# Patient Record
Sex: FEMALE | Race: WHITE | NOT HISPANIC OR LATINO | Employment: OTHER | URBAN - METROPOLITAN AREA
[De-identification: names, ages, dates, MRNs, and addresses within clinical notes are randomized per-mention and may not be internally consistent; named-entity substitution may affect disease eponyms.]

---

## 2020-05-13 ENCOUNTER — HISTORICAL (OUTPATIENT)
Dept: ADMINISTRATIVE | Facility: HOSPITAL | Age: 30
End: 2020-05-13

## 2020-05-13 LAB
ABS NEUT (OLG): 5.52 X10(3)/MCL (ref 2.1–9.2)
ALBUMIN SERPL-MCNC: 4.4 GM/DL (ref 3.5–5)
ALBUMIN/GLOB SERPL: 1.4 RATIO (ref 1.1–2)
ALP SERPL-CCNC: 105 UNIT/L (ref 40–150)
ALT SERPL-CCNC: 35 UNIT/L (ref 0–55)
AST SERPL-CCNC: 54 UNIT/L (ref 5–34)
BASOPHILS # BLD AUTO: 0.1 X10(3)/MCL (ref 0–0.2)
BASOPHILS NFR BLD AUTO: 1 %
BILIRUB SERPL-MCNC: 0.4 MG/DL
BILIRUBIN DIRECT+TOT PNL SERPL-MCNC: 0.1 MG/DL (ref 0–0.5)
BILIRUBIN DIRECT+TOT PNL SERPL-MCNC: 0.3 MG/DL (ref 0–0.8)
BUN SERPL-MCNC: 10.7 MG/DL (ref 7–18.7)
CALCIUM SERPL-MCNC: 9.5 MG/DL (ref 8.4–10.2)
CHLORIDE SERPL-SCNC: 106 MMOL/L (ref 98–107)
CO2 SERPL-SCNC: 23 MMOL/L (ref 22–29)
CREAT SERPL-MCNC: 1.05 MG/DL (ref 0.55–1.02)
EOSINOPHIL # BLD AUTO: 0.1 X10(3)/MCL (ref 0–0.9)
EOSINOPHIL NFR BLD AUTO: 1 %
ERYTHROCYTE [DISTWIDTH] IN BLOOD BY AUTOMATED COUNT: 12.2 % (ref 11.5–17)
GLOBULIN SER-MCNC: 3.2 GM/DL (ref 2.4–3.5)
GLUCOSE SERPL-MCNC: 103 MG/DL (ref 74–100)
HCT VFR BLD AUTO: 41.6 % (ref 37–47)
HGB BLD-MCNC: 14 GM/DL (ref 12–16)
LYMPHOCYTES # BLD AUTO: 2 X10(3)/MCL (ref 0.6–4.6)
LYMPHOCYTES NFR BLD AUTO: 24 %
MCH RBC QN AUTO: 31.4 PG (ref 27–31)
MCHC RBC AUTO-ENTMCNC: 33.7 GM/DL (ref 33–36)
MCV RBC AUTO: 93.3 FL (ref 80–94)
MONOCYTES # BLD AUTO: 0.6 X10(3)/MCL (ref 0.1–1.3)
MONOCYTES NFR BLD AUTO: 7 %
NEUTROPHILS # BLD AUTO: 5.52 X10(3)/MCL (ref 2.1–9.2)
NEUTROPHILS NFR BLD AUTO: 67 %
PLATELET # BLD AUTO: 415 X10(3)/MCL (ref 130–400)
PMV BLD AUTO: 11 FL (ref 9.4–12.4)
POTASSIUM SERPL-SCNC: 4.2 MMOL/L (ref 3.5–5.1)
PROT SERPL-MCNC: 7.6 GM/DL (ref 6.4–8.3)
RBC # BLD AUTO: 4.46 X10(6)/MCL (ref 4.2–5.4)
SODIUM SERPL-SCNC: 140 MMOL/L (ref 136–145)
WBC # SPEC AUTO: 8.3 X10(3)/MCL (ref 4.5–11.5)

## 2020-06-23 ENCOUNTER — HISTORICAL (OUTPATIENT)
Dept: LAB | Facility: HOSPITAL | Age: 30
End: 2020-06-23

## 2020-06-23 LAB
B-HCG SERPL QL: NEGATIVE
POC BETA-HCG (QUAL): NEGATIVE

## 2022-04-11 ENCOUNTER — HISTORICAL (OUTPATIENT)
Dept: ADMINISTRATIVE | Facility: HOSPITAL | Age: 32
End: 2022-04-11

## 2022-04-27 VITALS
OXYGEN SATURATION: 96 % | SYSTOLIC BLOOD PRESSURE: 114 MMHG | BODY MASS INDEX: 24.88 KG/M2 | HEIGHT: 59 IN | DIASTOLIC BLOOD PRESSURE: 84 MMHG | WEIGHT: 123.44 LBS

## 2022-05-24 DIAGNOSIS — G96.01 CSF LEAK FROM NOSE: Primary | ICD-10-CM

## 2022-09-21 ENCOUNTER — HISTORICAL (OUTPATIENT)
Dept: ADMINISTRATIVE | Facility: HOSPITAL | Age: 32
End: 2022-09-21

## 2023-07-03 ENCOUNTER — HOSPITAL ENCOUNTER (INPATIENT)
Facility: HOSPITAL | Age: 33
LOS: 3 days | Discharge: HOME | DRG: 556 | End: 2023-07-06
Attending: EMERGENCY MEDICINE | Admitting: INTERNAL MEDICINE
Payer: MEDICARE

## 2023-07-03 DIAGNOSIS — R29.5 TRANSIENT PARALYSIS: Primary | ICD-10-CM

## 2023-07-03 DIAGNOSIS — R29.898 WEAKNESS OF BOTH LOWER EXTREMITIES: ICD-10-CM

## 2023-07-03 DIAGNOSIS — M54.42 ACUTE MIDLINE LOW BACK PAIN WITH BILATERAL SCIATICA: ICD-10-CM

## 2023-07-03 DIAGNOSIS — M54.41 ACUTE MIDLINE LOW BACK PAIN WITH BILATERAL SCIATICA: ICD-10-CM

## 2023-07-03 DIAGNOSIS — R20.2 PARESTHESIAS: ICD-10-CM

## 2023-07-03 LAB
ALBUMIN SERPL-MCNC: 4.6 G/DL (ref 3.5–5)
ALBUMIN/GLOB SERPL: 1.4 RATIO (ref 1.1–2)
ALP SERPL-CCNC: 85 UNIT/L (ref 40–150)
ALT SERPL-CCNC: 9 UNIT/L (ref 0–55)
AMPHET UR QL SCN: POSITIVE
APPEARANCE UR: CLEAR
AST SERPL-CCNC: 17 UNIT/L (ref 5–34)
B-HCG SERPL QL: NEGATIVE
BACTERIA #/AREA URNS AUTO: NORMAL /HPF
BARBITURATE SCN PRESENT UR: NEGATIVE
BASOPHILS # BLD AUTO: 0.07 X10(3)/MCL
BASOPHILS NFR BLD AUTO: 0.9 %
BENZODIAZ UR QL SCN: POSITIVE
BILIRUB UR QL STRIP.AUTO: NEGATIVE MG/DL
BILIRUBIN DIRECT+TOT PNL SERPL-MCNC: 0.7 MG/DL
BUN SERPL-MCNC: 8.9 MG/DL (ref 7–18.7)
CALCIUM SERPL-MCNC: 10.2 MG/DL (ref 8.4–10.2)
CANNABINOIDS UR QL SCN: NEGATIVE
CHLORIDE SERPL-SCNC: 103 MMOL/L (ref 98–107)
CO2 SERPL-SCNC: 26 MMOL/L (ref 22–29)
COCAINE UR QL SCN: NEGATIVE
COLOR UR: YELLOW
CREAT SERPL-MCNC: 0.98 MG/DL (ref 0.55–1.02)
CRP SERPL-MCNC: <1 MG/L
EOSINOPHIL # BLD AUTO: 0.12 X10(3)/MCL (ref 0–0.9)
EOSINOPHIL NFR BLD AUTO: 1.6 %
ERYTHROCYTE [DISTWIDTH] IN BLOOD BY AUTOMATED COUNT: 12 % (ref 11.5–17)
FENTANYL UR QL SCN: POSITIVE
GFR SERPLBLD CREATININE-BSD FMLA CKD-EPI: >60 MLS/MIN/1.73/M2
GLOBULIN SER-MCNC: 3.2 GM/DL (ref 2.4–3.5)
GLUCOSE SERPL-MCNC: 104 MG/DL (ref 74–100)
GLUCOSE UR QL STRIP.AUTO: NEGATIVE MG/DL
HCT VFR BLD AUTO: 45.3 % (ref 37–47)
HGB BLD-MCNC: 15.3 G/DL (ref 12–16)
IMM GRANULOCYTES # BLD AUTO: 0.03 X10(3)/MCL (ref 0–0.04)
IMM GRANULOCYTES NFR BLD AUTO: 0.4 %
KETONES UR QL STRIP.AUTO: ABNORMAL MG/DL
LEUKOCYTE ESTERASE UR QL STRIP.AUTO: NEGATIVE UNIT/L
LYMPHOCYTES # BLD AUTO: 2.27 X10(3)/MCL (ref 0.6–4.6)
LYMPHOCYTES NFR BLD AUTO: 30.1 %
MAGNESIUM SERPL-MCNC: 2 MG/DL (ref 1.6–2.6)
MCH RBC QN AUTO: 29.9 PG (ref 27–31)
MCHC RBC AUTO-ENTMCNC: 33.8 G/DL (ref 33–36)
MCV RBC AUTO: 88.6 FL (ref 80–94)
MDMA UR QL SCN: NEGATIVE
MONOCYTES # BLD AUTO: 0.47 X10(3)/MCL (ref 0.1–1.3)
MONOCYTES NFR BLD AUTO: 6.2 %
NEUTROPHILS # BLD AUTO: 4.58 X10(3)/MCL (ref 2.1–9.2)
NEUTROPHILS NFR BLD AUTO: 60.8 %
NITRITE UR QL STRIP.AUTO: NEGATIVE
NRBC BLD AUTO-RTO: 0 %
OPIATES UR QL SCN: POSITIVE
PCP UR QL: NEGATIVE
PH UR STRIP.AUTO: 8 [PH]
PH UR: 7.5 [PH] (ref 3–11)
PLATELET # BLD AUTO: 392 X10(3)/MCL (ref 130–400)
PMV BLD AUTO: 10.2 FL (ref 7.4–10.4)
POTASSIUM SERPL-SCNC: 3.9 MMOL/L (ref 3.5–5.1)
PROT SERPL-MCNC: 7.8 GM/DL (ref 6.4–8.3)
PROT UR QL STRIP.AUTO: NEGATIVE MG/DL
RBC # BLD AUTO: 5.11 X10(6)/MCL (ref 4.2–5.4)
RBC #/AREA URNS AUTO: <5 /HPF
RBC UR QL AUTO: NEGATIVE UNIT/L
SODIUM SERPL-SCNC: 138 MMOL/L (ref 136–145)
SP GR UR STRIP.AUTO: 1.01 (ref 1–1.03)
SQUAMOUS #/AREA URNS AUTO: <5 /HPF
UROBILINOGEN UR STRIP-ACNC: 0.2 MG/DL
VIT B12 SERPL-MCNC: 506 PG/ML (ref 213–816)
WBC # SPEC AUTO: 7.54 X10(3)/MCL (ref 4.5–11.5)
WBC #/AREA URNS AUTO: <5 /HPF

## 2023-07-03 PROCEDURE — 99223 PR INITIAL HOSPITAL CARE,LEVL III: ICD-10-PCS | Mod: ,,, | Performed by: SPECIALIST

## 2023-07-03 PROCEDURE — 99223 1ST HOSP IP/OBS HIGH 75: CPT | Mod: ,,, | Performed by: SPECIALIST

## 2023-07-03 PROCEDURE — 63600175 PHARM REV CODE 636 W HCPCS: Performed by: EMERGENCY MEDICINE

## 2023-07-03 PROCEDURE — 25000003 PHARM REV CODE 250: Performed by: INTERNAL MEDICINE

## 2023-07-03 PROCEDURE — 25500020 PHARM REV CODE 255: Performed by: INTERNAL MEDICINE

## 2023-07-03 PROCEDURE — 81001 URINALYSIS AUTO W/SCOPE: CPT | Performed by: EMERGENCY MEDICINE

## 2023-07-03 PROCEDURE — 96375 TX/PRO/DX INJ NEW DRUG ADDON: CPT

## 2023-07-03 PROCEDURE — 21400001 HC TELEMETRY ROOM

## 2023-07-03 PROCEDURE — 86160 COMPLEMENT ANTIGEN: CPT | Performed by: INTERNAL MEDICINE

## 2023-07-03 PROCEDURE — 80307 DRUG TEST PRSMV CHEM ANLYZR: CPT | Performed by: EMERGENCY MEDICINE

## 2023-07-03 PROCEDURE — 63600175 PHARM REV CODE 636 W HCPCS: Performed by: INTERNAL MEDICINE

## 2023-07-03 PROCEDURE — 82607 VITAMIN B-12: CPT | Performed by: INTERNAL MEDICINE

## 2023-07-03 PROCEDURE — 11000001 HC ACUTE MED/SURG PRIVATE ROOM

## 2023-07-03 PROCEDURE — 86140 C-REACTIVE PROTEIN: CPT | Performed by: EMERGENCY MEDICINE

## 2023-07-03 PROCEDURE — 81025 URINE PREGNANCY TEST: CPT | Performed by: EMERGENCY MEDICINE

## 2023-07-03 PROCEDURE — 85025 COMPLETE CBC W/AUTO DIFF WBC: CPT | Performed by: EMERGENCY MEDICINE

## 2023-07-03 PROCEDURE — 99285 EMERGENCY DEPT VISIT HI MDM: CPT | Mod: 25

## 2023-07-03 PROCEDURE — 96361 HYDRATE IV INFUSION ADD-ON: CPT

## 2023-07-03 PROCEDURE — 83735 ASSAY OF MAGNESIUM: CPT | Performed by: EMERGENCY MEDICINE

## 2023-07-03 PROCEDURE — A9577 INJ MULTIHANCE: HCPCS | Performed by: INTERNAL MEDICINE

## 2023-07-03 PROCEDURE — 80053 COMPREHEN METABOLIC PANEL: CPT | Performed by: EMERGENCY MEDICINE

## 2023-07-03 RX ORDER — LEVOTHYROXINE SODIUM 125 UG/1
125 TABLET ORAL
COMMUNITY

## 2023-07-03 RX ORDER — LIDOCAINE 50 MG/G
1 PATCH TOPICAL
Status: COMPLETED | OUTPATIENT
Start: 2023-07-03 | End: 2023-07-06

## 2023-07-03 RX ORDER — ONDANSETRON 2 MG/ML
4 INJECTION INTRAMUSCULAR; INTRAVENOUS
Status: COMPLETED | OUTPATIENT
Start: 2023-07-03 | End: 2023-07-03

## 2023-07-03 RX ORDER — LORAZEPAM 2 MG/ML
1 INJECTION INTRAMUSCULAR ONCE
Status: COMPLETED | OUTPATIENT
Start: 2023-07-03 | End: 2023-07-03

## 2023-07-03 RX ORDER — LEVOTHYROXINE SODIUM 125 UG/1
125 TABLET ORAL
Status: DISCONTINUED | OUTPATIENT
Start: 2023-07-04 | End: 2023-07-06 | Stop reason: HOSPADM

## 2023-07-03 RX ORDER — PREDNISONE 20 MG/1
20 TABLET ORAL DAILY
Status: DISCONTINUED | OUTPATIENT
Start: 2023-07-04 | End: 2023-07-04

## 2023-07-03 RX ORDER — PREDNISONE 10 MG/1
10 TABLET ORAL DAILY
Status: DISCONTINUED | OUTPATIENT
Start: 2023-07-04 | End: 2023-07-03

## 2023-07-03 RX ORDER — OXYCODONE HYDROCHLORIDE 5 MG/1
5 TABLET ORAL EVERY 6 HOURS PRN
Status: DISCONTINUED | OUTPATIENT
Start: 2023-07-03 | End: 2023-07-05

## 2023-07-03 RX ORDER — OXYCODONE HYDROCHLORIDE 5 MG/1
10 TABLET ORAL EVERY 6 HOURS PRN
Status: DISCONTINUED | OUTPATIENT
Start: 2023-07-03 | End: 2023-07-05

## 2023-07-03 RX ORDER — GABAPENTIN 100 MG/1
100 CAPSULE ORAL 3 TIMES DAILY
Status: DISCONTINUED | OUTPATIENT
Start: 2023-07-03 | End: 2023-07-03

## 2023-07-03 RX ORDER — PREDNISONE 10 MG/1
10 TABLET ORAL DAILY
COMMUNITY

## 2023-07-03 RX ORDER — HYDROXYCHLOROQUINE SULFATE 200 MG/1
200 TABLET, FILM COATED ORAL 2 TIMES DAILY
Status: DISCONTINUED | OUTPATIENT
Start: 2023-07-03 | End: 2023-07-06 | Stop reason: HOSPADM

## 2023-07-03 RX ORDER — BACLOFEN 5 MG/1
5 TABLET ORAL 3 TIMES DAILY
Status: DISPENSED | OUTPATIENT
Start: 2023-07-03 | End: 2023-07-05

## 2023-07-03 RX ORDER — LORAZEPAM 2 MG/ML
1 INJECTION INTRAMUSCULAR
Status: COMPLETED | OUTPATIENT
Start: 2023-07-03 | End: 2023-07-03

## 2023-07-03 RX ORDER — GABAPENTIN 300 MG/1
300 CAPSULE ORAL 3 TIMES DAILY
Status: DISCONTINUED | OUTPATIENT
Start: 2023-07-03 | End: 2023-07-06 | Stop reason: HOSPADM

## 2023-07-03 RX ORDER — MORPHINE SULFATE 4 MG/ML
4 INJECTION, SOLUTION INTRAMUSCULAR; INTRAVENOUS
Status: COMPLETED | OUTPATIENT
Start: 2023-07-03 | End: 2023-07-03

## 2023-07-03 RX ORDER — ACETAMINOPHEN 325 MG/1
650 TABLET ORAL EVERY 6 HOURS PRN
Status: DISCONTINUED | OUTPATIENT
Start: 2023-07-03 | End: 2023-07-06 | Stop reason: HOSPADM

## 2023-07-03 RX ORDER — DROPERIDOL 2.5 MG/ML
1.25 INJECTION, SOLUTION INTRAMUSCULAR; INTRAVENOUS ONCE
Status: DISCONTINUED | OUTPATIENT
Start: 2023-07-03 | End: 2023-07-03

## 2023-07-03 RX ORDER — ONDANSETRON 2 MG/ML
4 INJECTION INTRAMUSCULAR; INTRAVENOUS EVERY 6 HOURS PRN
Status: DISCONTINUED | OUTPATIENT
Start: 2023-07-03 | End: 2023-07-04

## 2023-07-03 RX ORDER — DROPERIDOL 2.5 MG/ML
1.25 INJECTION, SOLUTION INTRAMUSCULAR; INTRAVENOUS ONCE
Status: COMPLETED | OUTPATIENT
Start: 2023-07-03 | End: 2023-07-03

## 2023-07-03 RX ORDER — GABAPENTIN 100 MG/1
100 CAPSULE ORAL 3 TIMES DAILY
COMMUNITY

## 2023-07-03 RX ORDER — HYDROXYCHLOROQUINE SULFATE 200 MG/1
200 TABLET, FILM COATED ORAL 2 TIMES DAILY
COMMUNITY

## 2023-07-03 RX ADMIN — LORAZEPAM 1 MG: 2 INJECTION INTRAMUSCULAR; INTRAVENOUS at 04:07

## 2023-07-03 RX ADMIN — LIDOCAINE PATCH 5% 1 PATCH: 700 PATCH TOPICAL at 07:07

## 2023-07-03 RX ADMIN — DROPERIDOL 1.25 MG: 2.5 INJECTION, SOLUTION INTRAMUSCULAR; INTRAVENOUS at 07:07

## 2023-07-03 RX ADMIN — MORPHINE SULFATE 4 MG: 4 INJECTION, SOLUTION INTRAMUSCULAR; INTRAVENOUS at 04:07

## 2023-07-03 RX ADMIN — SODIUM CHLORIDE, POTASSIUM CHLORIDE, SODIUM LACTATE AND CALCIUM CHLORIDE 1000 ML: 600; 310; 30; 20 INJECTION, SOLUTION INTRAVENOUS at 04:07

## 2023-07-03 RX ADMIN — GABAPENTIN 300 MG: 300 CAPSULE ORAL at 08:07

## 2023-07-03 RX ADMIN — HYDROXYCHLOROQUINE SULFATE 200 MG: 200 TABLET, FILM COATED ORAL at 08:07

## 2023-07-03 RX ADMIN — ONDANSETRON 4 MG: 2 INJECTION INTRAMUSCULAR; INTRAVENOUS at 04:07

## 2023-07-03 RX ADMIN — LORAZEPAM 1 MG: 2 INJECTION INTRAMUSCULAR; INTRAVENOUS at 12:07

## 2023-07-03 RX ADMIN — GADOBENATE DIMEGLUMINE 11 ML: 529 INJECTION, SOLUTION INTRAVENOUS at 02:07

## 2023-07-03 RX ADMIN — OXYCODONE HYDROCHLORIDE 10 MG: 5 TABLET ORAL at 07:07

## 2023-07-03 NOTE — CONSULTS
[facility] - Emergency Dept  Neurology  Consult Note    Patient Name: Davonte Ortega  MRN: [id number]  Admission Date: 7/3/2023  Hospital Length of Stay: 0 days  Code Status: No Order   Attending Provider: Celina Lopes MD   Consulting Provider: NAHEED CalvinCNP-BC  Primary Care Physician: Charlie Smith MD  Principal Problem:<principal problem not specified>    Inpatient consult to Neurology  Consult performed by: AMEE Calvin  Consult ordered by: Annita Hopper MD    Inpatient consult to Neurology  Consult performed by: AMEE Calvin  Consult ordered by: RADHA Jo         Subjective:     Chief Complaint:       HPI:   32 y/o female with PMH lupus, ulcerative colitis, fibromyalgia who presented to ED on 7/3 with back pain, numbness, and weakness.  Patient has lupus reportedly in remission and has been off immunosuppressants.  Patient's  at bedside reported he had recent GI virus last week, followed by patient getting it as well, improved by 6/30.  Later that day on 06/30 patient began to have lower back pain near her tailbone with numbness to BLE.  Patient reported taking warm baths really for pain, followed by worsening weakness/numbness to BLE.  Of note, patient is able to ambulate to ED, however, weakness progressed to BUE numbness/weakness and BLE weakness to where she was nonambulatory.  Patient denied bowel/bladder incontinence and saddle anesthesia.  Patient reportedly tearful and unrevealing on initial ED presentation.  She was given morphine and Ativan with improvement of pain and anxiety, but she reported numbness or weakness.  Patient given inapsine in ED followed by improvement of mobility and sensation to BLE.    MRI brain and spine w w/o unrevealing for acute abnormalities or abnormal enhancement. Labs significant for UDS positive for opiates, amphetamine, and fentanyl, otherwise unremarkable.       Past Medical History:   Diagnosis Date     Systemic lupus erythematosus, organ or system involvement unspecified     Ulcerative colitis        History reviewed. No pertinent surgical history.    Review of patient's allergies indicates:   Allergen Reactions    Cefaclor Hives and Swelling    Fluconazole Swelling    Levofloxacin Swelling    Sulfa (sulfonamide antibiotics) Swelling    Trimethoprim Swelling       Current Neurological Medications:     No current facility-administered medications on file prior to encounter.     No current outpatient medications on file prior to encounter.     Family History    None       Tobacco Use    Smoking status: Not on file    Smokeless tobacco: Not on file   Substance and Sexual Activity    Alcohol use: Not on file    Drug use: Not on file    Sexual activity: Not on file     Review of Systems  A 14pt ros was reviewed & is negative unless o/w documented in the hpi    Objective:     Vital Signs (Most Recent):  Temp: 98.3 °F (36.8 °C) (07/03/23 0108)  Pulse: 78 (07/03/23 1000)  Resp: 14 (07/03/23 0715)  BP: 126/86 (07/03/23 1000)  SpO2: 100 % (07/03/23 1000) Vital Signs (24h Range):  Temp:  [98.3 °F (36.8 °C)] 98.3 °F (36.8 °C)  Pulse:  [] 78  Resp:  [14-22] 14  SpO2:  [93 %-100 %] 100 %  BP: (107-127)/(83-98) 126/86     Weight: 53.5 kg (118 lb)  Body mass index is 23.83 kg/m².     Physical Exam  Vitals reviewed.   Constitutional:       General: She is awake.      Appearance: Normal appearance.   HENT:      Head: Normocephalic and atraumatic.      Nose: Nose normal.      Mouth/Throat:      Mouth: Mucous membranes are moist.      Pharynx: Oropharynx is clear.   Eyes:      Extraocular Movements: Extraocular movements intact and EOM normal.      Pupils: Pupils are equal, round, and reactive to light.   Pulmonary:      Effort: Pulmonary effort is normal.   Musculoskeletal:         General: Normal range of motion.      Cervical back: Normal range of motion.   Skin:     General: Skin is warm and dry.   Neurological:       Mental Status: She is alert and oriented to person, place, and time.      Deep Tendon Reflexes:      Reflex Scores:       Patellar reflexes are 2+ on the right side and 2+ on the left side.       Achilles reflexes are 2+ on the right side and 2+ on the left side.  Psychiatric:         Speech: Speech normal.         Behavior: Behavior is cooperative.        NEUROLOGICAL EXAMINATION:     MENTAL STATUS   Oriented to person, place, and time.   Follows 3 step commands.   Attention: normal. Concentration: normal.   Speech: speech is normal   Level of consciousness: alert  Knowledge: good.     CRANIAL NERVES     CN II   Visual fields full to confrontation.     CN III, IV, VI   Pupils are equal, round, and reactive to light.  Extraocular motions are normal.   Nystagmus: none   Conjugate gaze: present    CN V   Facial sensation intact.     CN VII   Facial expression full, symmetric.     MOTOR EXAM     Strength   Right deltoid: 5/5  Left deltoid: 5/5  Right biceps: 5/5  Left biceps: 5/5  Right triceps: 5/5  Left triceps: 5/5  Right quadriceps: 5/5  Left quadriceps: 5/5  Right hamstrin/5  Left hamstrin/5  Right glutei: 5/5  Right anterior tibial: 5/5  Left anterior tibial: 5/5  Right posterior tibial: 5/5  Left posterior tibial: 5/5    REFLEXES     Reflexes   Right patellar: 2+  Left patellar: 2+  Right achilles: 2+  Left achilles: 2+  Right plantar: normal  Left plantar: normal  Right ankle clonus: absent  Left ankle clonus: absent    SENSORY EXAM   Light touch normal.     Significant Labs:   Recent Lab Results         23  0555   23  0153        Phencyclidine Negative         Albumin/Globulin Ratio   1.4       Albumin   4.6       Alkaline Phosphatase   85       ALT   9       Amphetamine Screen, Ur Positive         AST   17       Barbiturate Screen, Ur Negative         Baso #   0.07       Basophil %   0.9       Benzodiazepine Screen, Urine Positive         BILIRUBIN TOTAL   0.7       BUN   8.9        Calcium   10.2       Cannabinoids, Urine Negative         Chloride   103       CO2   26       Cocaine (Metab.) Negative         Creatinine   0.98       CRP   <1.00       eGFR   >60       Eos #   0.12       Eosinophil %   1.6       Fentanyl, Urine Positive         Globulin, Total   3.2       Glucose   104       Hematocrit   45.3       Hemoglobin   15.3       Immature Grans (Abs)   0.03       Immature Granulocytes   0.4       Lymph #   2.27       LYMPH %   30.1       Magnesium   2.00       MCH   29.9       MCHC   33.8       MCV   88.6       MDMA, Urine Negative         Mono #   0.47       Mono %   6.2       MPV   10.2       Neut #   4.58       Neut %   60.8       nRBC   0.0       Opiate Scrn, Ur Positive         pH, Urine 7.5         Platelets   392       Potassium   3.9       Preg Test, Ur Negative         PROTEIN TOTAL   7.8       RBC   5.11       RDW   12.0       Sodium   138       WBC   7.54               Significant Imaging:   MRI brain w w/o 7/3/2023:  FINDINGS:  No intracranial mass or lesion is seen.  No hemorrhage is seen.  No acute infarct is seen.  No diffusion abnormality seen.  No parenchymal abnormality is seen.  Gyri and sulci appear normal.  Ventricles and basilar cisterns appear grossly unremarkable.  No abnormal white matter changes are seen..  Posterior fossa appears normal.  Postcontrast imaging demonstrates no evidence of abnormal enhancement.  No mass is seen.  No vascular enhancement seen.  No dural enhancement is seen.  Calvarium is intact.  There is evidence of right maxillary sinusitis     Impression:     Right maxillary sinusitis otherwise unremarkable    MRI c-spine w w/o 7/3/2023:  FINDINGS:  Cervical alignment is normal.  The vertebral body heights are maintained.  The bone marrow is normal signal.     There is no cord signal abnormality.  There is no abnormal cord enhancement.  There is no drainable fluid collection.     A tiny right central disc protrusion at C4-C5 measures less than 2  mm in thickness.  There is no significant spinal canal or neural foraminal stenosis.     The paraspinal soft tissues are unremarkable.  There is no drainable fluid collection.     Impression:     No cord signal abnormality or evidence of active demyelination.    MRI t-spine w w/o 7/3/2023:  FINDINGS:  Thoracic alignment is normal.  The vertebral body heights are maintained.  The bone marrow is normal in signal.     There is no cord signal abnormality.  There is no abnormal cord enhancement.  There is no epidural fluid collection.     There is no disc herniation.  The spinal canal and neural foramina are patent.     The paraspinal soft tissues are unremarkable.     Impression:     No cord signal abnormality or evidence of active demyelination.     MRI l-spine w w/o 7/3/2023:  FINDINGS:  The vertebral body heights and alignment are well maintained.  No fracture seen.  No dislocation is seen.  Cord ends at T12-L1.  Visualized portion of the conus appears grossly unremarkable.  No cord lesion is seen.  No lesion is seen in the cauda equina.  No areas of abnormal enhancement are seen.     No disc bulge or herniation is seen.  No spinal or foraminal stenosis seen.  No significant facet arthropathy is seen.     Paraspinal soft tissues appear grossly unremarkable.     Impression:     Normal MRI of the lumbar spine with no evidence of demyelinating process seen  I have reviewed all pertinent imaging results/findings within the past 24 hours.    Assessment and Plan:     Weakness of both lower extremities  Resolved    -Fall precautions          VTE Risk Mitigation (From admission, onward)    None          Thank you for your consult. Further recommendations to follow per MD Kajal Mena, [facility]-BC  Inpatient Neurology  [facility] - Emergency Dept

## 2023-07-03 NOTE — SUBJECTIVE & OBJECTIVE
Past Medical History:   Diagnosis Date    Systemic lupus erythematosus, organ or system involvement unspecified     Ulcerative colitis        History reviewed. No pertinent surgical history.    Review of patient's allergies indicates:   Allergen Reactions    Cefaclor Hives and Swelling    Fluconazole Swelling    Levofloxacin Swelling    Sulfa (sulfonamide antibiotics) Swelling    Trimethoprim Swelling       Current Neurological Medications:     No current facility-administered medications on file prior to encounter.     No current outpatient medications on file prior to encounter.     Family History    None       Tobacco Use    Smoking status: Not on file    Smokeless tobacco: Not on file   Substance and Sexual Activity    Alcohol use: Not on file    Drug use: Not on file    Sexual activity: Not on file     Review of Systems  A 14pt ros was reviewed & is negative unless o/w documented in the hpi    Objective:     Vital Signs (Most Recent):  Temp: 98.3 °F (36.8 °C) (07/03/23 0108)  Pulse: 78 (07/03/23 1000)  Resp: 14 (07/03/23 0715)  BP: 126/86 (07/03/23 1000)  SpO2: 100 % (07/03/23 1000) Vital Signs (24h Range):  Temp:  [98.3 °F (36.8 °C)] 98.3 °F (36.8 °C)  Pulse:  [] 78  Resp:  [14-22] 14  SpO2:  [93 %-100 %] 100 %  BP: (107-127)/(83-98) 126/86     Weight: 53.5 kg (118 lb)  Body mass index is 23.83 kg/m².     Physical Exam  Vitals reviewed.   Constitutional:       General: She is awake.      Appearance: Normal appearance.   HENT:      Head: Normocephalic and atraumatic.      Nose: Nose normal.      Mouth/Throat:      Mouth: Mucous membranes are moist.      Pharynx: Oropharynx is clear.   Eyes:      Extraocular Movements: Extraocular movements intact and EOM normal.      Pupils: Pupils are equal, round, and reactive to light.   Pulmonary:      Effort: Pulmonary effort is normal.   Musculoskeletal:         General: Normal range of motion.      Cervical back: Normal range of motion.   Skin:     General: Skin is  warm and dry.   Neurological:      Mental Status: She is alert and oriented to person, place, and time.      Deep Tendon Reflexes:      Reflex Scores:       Patellar reflexes are 2+ on the right side and 2+ on the left side.       Achilles reflexes are 2+ on the right side and 2+ on the left side.  Psychiatric:         Speech: Speech normal.         Behavior: Behavior is cooperative.        NEUROLOGICAL EXAMINATION:     MENTAL STATUS   Oriented to person, place, and time.   Follows 3 step commands.   Attention: normal. Concentration: normal.   Speech: speech is normal   Level of consciousness: alert  Knowledge: good.     CRANIAL NERVES     CN II   Visual fields full to confrontation.     CN III, IV, VI   Pupils are equal, round, and reactive to light.  Extraocular motions are normal.   Nystagmus: none   Conjugate gaze: present    CN V   Facial sensation intact.     CN VII   Facial expression full, symmetric.     MOTOR EXAM     Strength   Right deltoid: 5/5  Left deltoid: 5/5  Right biceps: 5/5  Left biceps: 5/5  Right triceps: 5/5  Left triceps: 5/5  Right quadriceps: 5/5  Left quadriceps: 5/5  Right hamstrin/5  Left hamstrin/5  Right glutei: 5/5  Right anterior tibial: 5/5  Left anterior tibial: 5/5  Right posterior tibial: 5/5  Left posterior tibial: 5/5    REFLEXES     Reflexes   Right patellar: 2+  Left patellar: 2+  Right achilles: 2+  Left achilles: 2+  Right plantar: normal  Left plantar: normal  Right ankle clonus: absent  Left ankle clonus: absent    SENSORY EXAM   Light touch normal.     Significant Labs:   Recent Lab Results         23  0555   23  0153        Phencyclidine Negative         Albumin/Globulin Ratio   1.4       Albumin   4.6       Alkaline Phosphatase   85       ALT   9       Amphetamine Screen, Ur Positive         AST   17       Barbiturate Screen, Ur Negative         Baso #   0.07       Basophil %   0.9       Benzodiazepine Screen, Urine Positive         BILIRUBIN TOTAL    0.7       BUN   8.9       Calcium   10.2       Cannabinoids, Urine Negative         Chloride   103       CO2   26       Cocaine (Metab.) Negative         Creatinine   0.98       CRP   <1.00       eGFR   >60       Eos #   0.12       Eosinophil %   1.6       Fentanyl, Urine Positive         Globulin, Total   3.2       Glucose   104       Hematocrit   45.3       Hemoglobin   15.3       Immature Grans (Abs)   0.03       Immature Granulocytes   0.4       Lymph #   2.27       LYMPH %   30.1       Magnesium   2.00       MCH   29.9       MCHC   33.8       MCV   88.6       MDMA, Urine Negative         Mono #   0.47       Mono %   6.2       MPV   10.2       Neut #   4.58       Neut %   60.8       nRBC   0.0       Opiate Scrn, Ur Positive         pH, Urine 7.5         Platelets   392       Potassium   3.9       Preg Test, Ur Negative         PROTEIN TOTAL   7.8       RBC   5.11       RDW   12.0       Sodium   138       WBC   7.54               Significant Imaging:   MRI brain w w/o 7/3/2023:  FINDINGS:  No intracranial mass or lesion is seen.  No hemorrhage is seen.  No acute infarct is seen.  No diffusion abnormality seen.  No parenchymal abnormality is seen.  Gyri and sulci appear normal.  Ventricles and basilar cisterns appear grossly unremarkable.  No abnormal white matter changes are seen..  Posterior fossa appears normal.  Postcontrast imaging demonstrates no evidence of abnormal enhancement.  No mass is seen.  No vascular enhancement seen.  No dural enhancement is seen.  Calvarium is intact.  There is evidence of right maxillary sinusitis     Impression:     Right maxillary sinusitis otherwise unremarkable    MRI c-spine w w/o 7/3/2023:  FINDINGS:  Cervical alignment is normal.  The vertebral body heights are maintained.  The bone marrow is normal signal.     There is no cord signal abnormality.  There is no abnormal cord enhancement.  There is no drainable fluid collection.     A tiny right central disc protrusion at  C4-C5 measures less than 2 mm in thickness.  There is no significant spinal canal or neural foraminal stenosis.     The paraspinal soft tissues are unremarkable.  There is no drainable fluid collection.     Impression:     No cord signal abnormality or evidence of active demyelination.    MRI t-spine w w/o 7/3/2023:  FINDINGS:  Thoracic alignment is normal.  The vertebral body heights are maintained.  The bone marrow is normal in signal.     There is no cord signal abnormality.  There is no abnormal cord enhancement.  There is no epidural fluid collection.     There is no disc herniation.  The spinal canal and neural foramina are patent.     The paraspinal soft tissues are unremarkable.     Impression:     No cord signal abnormality or evidence of active demyelination.     MRI l-spine w w/o 7/3/2023:  FINDINGS:  The vertebral body heights and alignment are well maintained.  No fracture seen.  No dislocation is seen.  Cord ends at T12-L1.  Visualized portion of the conus appears grossly unremarkable.  No cord lesion is seen.  No lesion is seen in the cauda equina.  No areas of abnormal enhancement are seen.     No disc bulge or herniation is seen.  No spinal or foraminal stenosis seen.  No significant facet arthropathy is seen.     Paraspinal soft tissues appear grossly unremarkable.     Impression:     Normal MRI of the lumbar spine with no evidence of demyelinating process seen  I have reviewed all pertinent imaging results/findings within the past 24 hours.

## 2023-07-03 NOTE — HPI
34 y/o female with PMH lupus, ulcerative colitis, fibromyalgia who presented to ED on 7/3 with back pain, numbness, and weakness.  Patient has lupus reportedly in remission and has been off immunosuppressants.  Patient's  at bedside reported he had recent GI virus last week, followed by patient getting it as well, improved by 6/30.  Later that day on 06/30 patient began to have lower back pain near her tailbone with numbness to BLE.  Patient reported taking warm baths really for pain, followed by worsening weakness/numbness to BLE.  Of note, patient is able to ambulate to ED, however, weakness progressed to BUE numbness/weakness and BLE weakness to where she was nonambulatory.  Patient denied bowel/bladder incontinence and saddle anesthesia.  Patient reportedly tearful and unrevealing on initial ED presentation.  She was given morphine and Ativan with improvement of pain and anxiety, but she reported numbness or weakness.  Patient given inapsine in ED followed by improvement of mobility and sensation to BLE.    MRI brain and spine w w/o unrevealing for acute abnormalities or abnormal enhancement. Labs significant for UDS positive for opiates, amphetamine, and fentanyl, otherwise unremarkable.

## 2023-07-03 NOTE — ED PROVIDER NOTES
"Encounter Date: 7/3/2023    SCRIBE #1 NOTE: I, Vaibhav Skelton, am scribing for, and in the presence of,  Annita Hopper MD. I have scribed the following portions of the note - Other sections scribed: HPI,ROS,PE.     History     Chief Complaint   Patient presents with    Numbness     Pt c/o numbness or tingling to bilateral lower extremities. During the week pt had a stomach bug, improved by Friday and then Friday evening pt started to have lower back pain and some numbness then today numbness and tingling has gotten worse. Pt states also decreased muscle strength and difficulty walking. PMH of lupus, UC, Hashimoto's, fibromyalgia,      34 y/o female with PMH of lupus, ulcerative colitis and fibromyalgia presents to ED for back pain, numbness and weakness.   Per , patient's lupus has been in remission for the past year and a half. She has been on no immunosuppressants. Two weeks ago, she began having a "lupus rash" on her arms and her doctor started her on Prednisone and it has nearly resolved.  reports he got a GI virus last week and then patient got it, improved on Friday 6/30. Later that day, she began having lower back pain near her tailbone and numbness to both legs. She reports she took a warm bath tonight to relieve her pain and since then has had worsening weakness/numbness to her legs. She was able to walk into the ED but now has progressed to bilateral arm numbness/weakness and she is unable to walk.       The history is provided by the patient and the spouse. No  was used.   Review of patient's allergies indicates:   Allergen Reactions    Cefaclor Hives and Swelling    Fluconazole Swelling    Levofloxacin Swelling    Sulfa (sulfonamide antibiotics) Swelling    Trimethoprim Swelling     Past Medical History:   Diagnosis Date    Systemic lupus erythematosus, organ or system involvement unspecified     Ulcerative colitis      History reviewed. No pertinent surgical " history.  History reviewed. No pertinent family history.     Review of Systems   Respiratory:  Negative for shortness of breath.    Gastrointestinal:  Positive for nausea and vomiting.   Musculoskeletal:  Positive for back pain.   Skin:  Positive for rash.   Neurological:  Positive for weakness and numbness.        Tingling in arms. Numbness in legs.    Psychiatric/Behavioral:  The patient is nervous/anxious.      Physical Exam     Initial Vitals [07/03/23 0108]   BP Pulse Resp Temp SpO2   (!) 127/98 (!) 113 18 98.3 °F (36.8 °C) 96 %      MAP       --         Physical Exam    Nursing note and vitals reviewed.  Constitutional: She appears well-developed and well-nourished. She is not diaphoretic. She does not appear ill. She appears distressed.   Extremely anxious, tearful, hyperventilating    HENT:   Head: Normocephalic and atraumatic.   Right Ear: External ear normal.   Left Ear: External ear normal.   Mouth/Throat: Oropharynx is clear and moist.   Eyes: Conjunctivae and EOM are normal. Pupils are equal, round, and reactive to light.   Neck: Neck supple. No tracheal deviation present.   Cardiovascular:  Regular rhythm and normal heart sounds.           No murmur heard.  Tachycardic.    Pulmonary/Chest: Tachypnea noted. No respiratory distress. She has no wheezes. She has no rhonchi. She has no rales.   hyperventilating   Abdominal: Abdomen is soft. She exhibits no distension. There is no abdominal tenderness.   No right CVA tenderness.  No left CVA tenderness.   Musculoskeletal:         General: No edema.      Cervical back: Neck supple.      Comments: +lumbar spine tenderness      Neurological: She is alert and oriented to person, place, and time. She displays no seizure activity. GCS score is 15. GCS eye subscore is 4. GCS verbal subscore is 5. GCS motor subscore is 6.   Diminished sensation of BUE, chest/abdomen, BLE but has good perineal sensation. Good rectal tone. Difficult to assess motor strength, will not  move against resistance but when arms put in extension, able to keep them held in that position. No pronator drift    Skin: Skin is warm and dry. Capillary refill takes less than 2 seconds. No rash noted. No pallor.       ED Course   Procedures  Labs Reviewed   COMPREHENSIVE METABOLIC PANEL - Abnormal; Notable for the following components:       Result Value    Glucose Level 104 (*)     All other components within normal limits   DRUG SCREEN, URINE (BEAKER) - Abnormal; Notable for the following components:    Amphetamines, Urine Positive (*)     Benzodiazepine, Urine Positive (*)     Fentanyl, Urine Positive (*)     Opiates, Urine Positive (*)     All other components within normal limits    Narrative:     Cut off concentrations:    Amphetamines - 1000 ng/ml  Barbiturates - 200 ng/ml  Benzodiazepine - 200 ng/ml  Cannabinoids (THC) - 50 ng/ml  Cocaine - 300 ng/ml  Fentanyl - 1.0 ng/ml  MDMA - 500 ng/ml  Opiates - 300 ng/ml   Phencyclidine (PCP) - 25 ng/ml    Specimen submitted for drug analysis and tested for pH and specific gravity in order to evaluate sample integrity. Suspect tampering if specific gravity is <1.003 and/or pH is not within the range of 4.5 - 8.0  False negatives may result form substances such as bleach added to urine.  False positives may result for the presence of a substance with similar chemical structure to the drug or its metabolite.    This test provides only a PRELIMINARY analytical test result. A more specific alternate chemical method must be used in order to obtain a confirmed analytical result. Gas chromatography/mass spectrometry (GC/MS) is the preferred confirmatory method. Other chemical confirmation methods are available. Clinical consideration and professional judgement should be applied to any drug of abuse test result, particularly when preliminary positive results are used.    Positive results will be confirmed only at the physicians request. Unconfirmed screening results are to  be used only for medical purposes (treatment).        PREGNANCY TEST, URINE RAPID - Normal   MAGNESIUM - Normal   C-REACTIVE PROTEIN - Normal   CBC W/ AUTO DIFFERENTIAL    Narrative:     The following orders were created for panel order CBC auto differential.  Procedure                               Abnormality         Status                     ---------                               -----------         ------                     CBC with Differential[[id number]]                            Final result                 Please view results for these tests on the individual orders.   CBC WITH DIFFERENTIAL   URINALYSIS, REFLEX TO URINE CULTURE          Imaging Results              CT Head Without Contrast (Final result)  Result time 07/03/23 09:43:01      Final result by Silvia Serrato MD (07/03/23 09:43:01)                   Impression:      No acute intracranial abnormality.      Electronically signed by: Silvia Serrato  Date:    07/03/2023  Time:    09:43               Narrative:    EXAMINATION:  CT HEAD WITHOUT CONTRAST    CLINICAL HISTORY:  weakness/numbness;    TECHNIQUE:  Axial scans were obtained from skull base to the vertex.    Coronal and sagittal reconstructions obtained from the axial data.    Automatic exposure control was utilized to limit radiation dose.    Contrast: None    Radiation Dose:    Total DLP: 923 mGy*cm    COMPARISON:  None    FINDINGS:  There is no acute intracranial hemorrhage or edema. The gray-white matter differentiation is preserved.    There is no mass effect or midline shift. The ventricles and sulci are normal in size. The basal cisterns are patent. There is no abnormal extra-axial fluid collection.    The calvarium and skull base are intact.  There is a right maxillary sinus mucosal retention cyst.                                       CT Lumbar Spine Without Contrast (Final result)  Result time 07/03/23 09:45:12      Final result by Silvia Serrato MD (07/03/23 09:45:12)                    Impression:      No acute abnormality identified.      Electronically signed by: Silvia Serrato  Date:    07/03/2023  Time:    09:45               Narrative:    EXAMINATION:  CT LUMBAR SPINE WITHOUT CONTRAST    CLINICAL HISTORY:  Low back pain, cauda equina syndrome suspected;    TECHNIQUE:  Noncontrast CT images of the lumbar spine. Axial, coronal, and sagittal reformatted images were obtained. Dose length product is 450 mGycm. Automatic exposure control, adjustment of mA/kV or iterative reconstruction technique was used to limit radiation dose.    COMPARISON:  None    FINDINGS:  There are 5 non-rib-bearing lumbar type vertebral bodies.  Alignment is normal.  The vertebral heights are maintained.  There is no acute fracture identified.  There is no evidence of a disc herniation.  The spinal canal and neural foramina are patent.  The paraspinal soft tissues are unremarkable.                                       Medications   LORazepam injection 1 mg (1 mg Intravenous Given 7/3/23 0429)   morphine injection 4 mg (4 mg Intravenous Given 7/3/23 0429)   ondansetron injection 4 mg (4 mg Intravenous Given 7/3/23 0429)   lactated ringers bolus 1,000 mL (0 mLs Intravenous Stopped 7/3/23 0530)   droPERidol injection 1.25 mg (1.25 mg Intravenous Given 7/3/23 0709)              Scribe Attestation:   Scribe #1: I performed the above scribed service and the documentation accurately describes the services I performed. I attest to the accuracy of the note.    Attending Attestation:           Physician Attestation for Scribe:  Physician Attestation Statement for Scribe #1: I, Annita Hopper MD, reviewed documentation, as scribed by Vaibhav Skelton in my presence, and it is both accurate and complete.       Medical Decision Making  32 yo female with back pain with BUE and BLE weakness and numbness.     ED management:  Given Morphine and Ativan with improvement of pain and anxiety but still numb and weak   MRI  brain and spine ordered   Labs unremarkable with exception of UDS positive for amphetamines, opioids and benzos (received in ED) and fentanyl  Given Inapsine and patient began moving and sensation started returning   CT brain and lumbar spine ordered as there will be a delay in MRI  CT head and lumbar spine normal  Given patient improvement, will admit for MRI and neuro consult             Problems Addressed:  Acute midline low back pain with bilateral sciatica: acute illness or injury that poses a threat to life or bodily functions  Paresthesias: acute illness or injury that poses a threat to life or bodily functions  Transient paralysis: acute illness or injury that poses a threat to life or bodily functions    Amount and/or Complexity of Data Reviewed  Labs: ordered. Decision-making details documented in ED Course.  Radiology: ordered. Decision-making details documented in ED Course.    Risk  Prescription drug management.  Decision regarding hospitalization.           ED Course as of 07/03/23 1011   Mon Jul 03, 2023   0725 Called to room by  who is tearful, states after medication, patient is now moving and not paralyzed. Patient reports she still feels weak and numb in some placed but overall improved.  [KM]   0808 Discussed with patient and  that MRI will be delayed for hours so we will proceed with CT head and lumbar spine  [KM]      ED Course User Index  [KM] Annita Hopper MD                 Clinical Impression:   Final diagnoses:  [R20.2] Paresthesias  [M54.42, M54.41] Acute midline low back pain with bilateral sciatica  [R29.5] Transient paralysis (Primary)        ED Disposition Condition    Admit Stable                Annita Hopper MD  07/03/23 1011

## 2023-07-03 NOTE — PLAN OF CARE
Pt lives at [address] with her , Lenny Penaloza [phone]. There are no steps or stairs. Pt is , no children, no living will or POA. PCP La at Dr. Hoskins office at [facility]. No agency involvement. No DME. Fills meds with [facility] on [address].  will transport home at [place].    07/03/23 1103   Discharge Assessment   Assessment Type Discharge Planning Assessment   Confirmed/corrected address, phone number and insurance Yes   Confirmed Demographics Correct on Facesheet   Source of Information patient   When was your last doctors appointment?   (PCP La at Dr. Hoskins office at [facility])   Communicated JENNIFER with patient/caregiver Date not available/Unable to determine   People in Home spouse   Do you expect to return to your current living situation? Yes   Do you have help at home or someone to help you manage your care at home? Yes   Who are your caregiver(s) and their phone number(s)?  Lenny Penaloza [phone]   Prior to hospitilization cognitive status: Unable to Assess   Current cognitive status: Alert/Oriented   Walking or Climbing Stairs   (none)   Dressing/Bathing   (none)   Home Accessibility wheelchair accessible   Home Layout Able to live on 1st floor   Equipment Currently Used at Home none   Readmission within 30 days? No   Patient currently being followed by outpatient case management? No   Do you currently have service(s) that help you manage your care at home? No   Do you take prescription medications? Yes  (Fills with [facility] on [address])   Do you have prescription coverage? Yes   Coverage [employer] medicare   Do you have any problems affording any of your prescribed medications? No   Is the patient taking medications as prescribed? yes   Who is going to help you get home at discharge?    How do you get to doctors appointments? car, drives self;family or friend will provide   Are you on dialysis? No   Do you take coumadin? No    Discharge Plan A Home with family   DME Needed Upon Discharge  none   Discharge Plan discussed with: Patient   Transition of Care Barriers None   OTHER   Name(s) of People in Home  Lenny

## 2023-07-03 NOTE — H&P
[facility] Hospital Medicine History & Physical Examination       Patient Name: Davonte Ortega  MRN: [id number]  Patient Class: IP- Inpatient   Admission Date: 7/3/2023   Admitting Physician: [facility] Service   Length of Stay: 0  Attending Physician:   Primary Care Provider: Charlie Smith MD  Face-to-Face encounter date: 07/03/2023  Code Status:Full Code  Chief Complaint: Numbness (Pt c/o numbness or tingling to bilateral lower extremities. During the week pt had a stomach bug, improved by Friday and then Friday evening pt started to have lower back pain and some numbness then today numbness and tingling has gotten worse. Pt states also decreased muscle strength and difficulty walking. PMH of lupus, UC, Hashimoto's, fibromyalgia, )        Patient information was obtained from patient, patient's family, past medical records and ER records.  ED records were reviewed in detail and documented below    MD addendum-  Pt with h/o of lupus followed locally by Dr. Cazares , ulcerative colitis, Hypothyroidism and fibromyalgia presented to the ED with c/o 2 days h/o ( started from Friday night) paresthesia and motor weakness involving both upper and lower ext ( proximal to distal) and  difficulty ambulating due to muscle weakness. Symptoms started following episodes of gastroenteritis and diarrhea on Thursday that resolved on Friday. Other associated symptoms include low back pain, sensation of pressure in the lower back but no saddle anesthesia , urinary or bowel disturbance. No h/o trauma or fall. Recent mild lupus flare up with onset of rash improved with prednisone therapy. Symptoms are progressive and worsening since onset. MRI brain, C/T/L spine w/wo contrast in the ED today were neg for demyelinating disease. Labs were all unremarkable. UA neg for infection. UDS + amphetamine, Fentanyl, Opiates and bezo, however pt denied use of such except Norco at home. Pt did receive Ativan,  morphine, Zofran, Droperidol in the ED could contribute to abnormal UDS.     Symptoms improved somewhat and pt was able to getup and walk to the bathroom after MRI and shortly after receiving Droperidol. Neurology consult obtained and suggested reassess pt in am     A/P-  Paresthesia and weakness of extremities of unclear etiology   Recent h/o Viral Gastroenteritis   H/O Lupus, UC, Hypothyroidism and Fibromyalgia     - ?? Guillain- [place] syndrome  -Get HIV, Hepatitis, syphilis serology  -C3, C4 complement   -B12 , folate level  -Increase home prednisone to 20 mg daily   -Increase home Neurontin to 300 mg tid  -Add Baclofen , Lidoderm patch for back pain  -PT/OT consult   -Rheumatology consult               HISTORY OF PRESENT ILLNESS:   The patient a 33-year-old female with a past medical history of lupus, ulcerative colitis, and fibromyalgia who presented to the emergency department for complaints of low back pain, numbness, and weakness to her lower extremities which has progressed to the bilateral upper extremities since admission to the ED; previously her lupus had been in remission for the past year and a half and she had not been on any immunosuppressive medications.She was treated for a lupus rash which she developed recently and was treated with Prednisone per her physician.  CT of the brain without contrast and CT of the lumbar spine without contrast were both within normal limits.  Labs are all within normal limits as well.  No fever chills night sweats, mucopurulent sputum production or hemoptysis. The pt did have a viral gastroenteritis last week which has since resolved; the patient was seen and evaluated in the ED post MRI. Since admission, her limb weakness has improved and she was able to ambulate to the restroom; still with some BUE residual numbness at this time and some LBP; reports no swallowing difficulties.Denies any trauma- no recent falls.      PAST MEDICAL HISTORY:     Past Medical History:    Diagnosis Date    Systemic lupus erythematosus, organ or system involvement unspecified     Ulcerative colitis        PAST SURGICAL HISTORY:   History reviewed. No pertinent surgical history.    ALLERGIES:   Cefaclor, Fluconazole, Levofloxacin, Sulfa (sulfonamide antibiotics), and Trimethoprim    FAMILY HISTORY:   Reviewed and negative    SOCIAL HISTORY:     Social History     Tobacco Use    Smoking status: Not on file    Smokeless tobacco: Not on file   Substance Use Topics    Alcohol use: Not on file        HOME MEDICATIONS:     Prior to Admission medications    Not on File       REVIEW OF SYSTEMS:   Except as documented, all other systems reviewed and negative     PHYSICAL EXAM:     VITAL SIGNS: 24 HRS MIN & MAX LAST   Temp  Min: 98.3 °F (36.8 °C)  Max: 98.3 °F (36.8 °C) 98.3 °F (36.8 °C)   BP  Min: 107/84  Max: 127/83 126/86   Pulse  Min: 78  Max: 142  78   Resp  Min: 14  Max: 22 14   SpO2  Min: 93 %  Max: 100 % 100 %       General appearance: Well-developed, well-nourished female lying on ED stretcher, in no apparent distress.  HENT: Atraumatic head. Moist mucous membranes of oral cavity.  Eyes: Normal extraocular movements.   Neck: Supple, no JVD   Lungs: Clear to auscultation bilaterally. No wheezing present.   Heart: Regular rate and rhythm. S1 and S2 present with no murmurs/gallop/rub. Sinus rhythm on monitor. No pedal edema. No JVD present.   Abdomen: Soft, non-distended, non-tender. No rebound tenderness/guarding. Bowel sounds are normal.   Extremities: No cyanosis, clubbing, or edema.  Skin: No Rash.   Neuro: Awake, alert, oriented x 3; no facial droop;speech is clear; Bilateral hand grasps equal; LE strength equal; Motor and sensory exams grossly intact. Good tone. Muscle strength 4/4 in all 4 extremities  Psych/mental status: Appropriate mood and affect. Responds appropriately to questions.     LABS AND IMAGING:     Recent Labs   Lab 07/03/23  0153   WBC 7.54   RBC 5.11   HGB 15.3   HCT 45.3   MCV  88.6   MCH 29.9   MCHC 33.8   RDW 12.0      MPV 10.2       Recent Labs   Lab 07/03/23  0153      K 3.9   CO2 26   BUN 8.9   CREATININE 0.98   CALCIUM 10.2   MG 2.00   ALBUMIN 4.6   ALKPHOS 85   ALT 9   AST 17   BILITOT 0.7       Microbiology Results (last 7 days)       ** No results found for the last 168 hours. **             CT Lumbar Spine Without Contrast  Narrative: EXAMINATION:  CT LUMBAR SPINE WITHOUT CONTRAST    CLINICAL HISTORY:  Low back pain, cauda equina syndrome suspected;    TECHNIQUE:  Noncontrast CT images of the lumbar spine. Axial, coronal, and sagittal reformatted images were obtained. Dose length product is 450 mGycm. Automatic exposure control, adjustment of mA/kV or iterative reconstruction technique was used to limit radiation dose.    COMPARISON:  None    FINDINGS:  There are 5 non-rib-bearing lumbar type vertebral bodies.  Alignment is normal.  The vertebral heights are maintained.  There is no acute fracture identified.  There is no evidence of a disc herniation.  The spinal canal and neural foramina are patent.  The paraspinal soft tissues are unremarkable.  Impression: No acute abnormality identified.    Electronically signed by: Silvia Serrato  Date:    07/03/2023  Time:    09:45  CT Head Without Contrast  Narrative: EXAMINATION:  CT HEAD WITHOUT CONTRAST    CLINICAL HISTORY:  weakness/numbness;    TECHNIQUE:  Axial scans were obtained from skull base to the vertex.    Coronal and sagittal reconstructions obtained from the axial data.    Automatic exposure control was utilized to limit radiation dose.    Contrast: None    Radiation Dose:    Total DLP: 923 mGy*cm    COMPARISON:  None    FINDINGS:  There is no acute intracranial hemorrhage or edema. The gray-white matter differentiation is preserved.    There is no mass effect or midline shift. The ventricles and sulci are normal in size. The basal cisterns are patent. There is no abnormal extra-axial fluid  collection.    The calvarium and skull base are intact.  There is a right maxillary sinus mucosal retention cyst.  Impression: No acute intracranial abnormality.    Electronically signed by: Silvia Serrato  Date:    07/03/2023  Time:    09:43        ASSESSMENT & PLAN:     Impression:  Paresthesias of BUEs & BLEs  Recent gastroenteritis  SLE- remission for past 1 1/2 years  Hx ulcerative colitis  Hx of fibromyalgia    Plan:  Neurology consult  Rheumatology consult  MRI of C-spine, T-spine, & LS spine      VTE Prophylaxis: will be placed on Heparin/Lovenox/ Xarelto/ SCD for DVT prophylaxis and will be advised to be as mobile as possible and sit in a chair as tolerated    Patient condition:  Stable/Fair/Guarded/ Serious/ Critical    __________________________________________________________________________  INPATIENT LIST OF MEDICATIONS     Scheduled Meds:  Continuous Infusions:  PRN Meds:.      Chantel GARDNER NP have reviewed and discussed the case with Dr. Lopes. Please see the following addendum for further assessment and plan from there attending MD.    07/03/2023    ________________________________________________________________________________    MD Addendum:  KENDRA, Dr.Shameem Lisa MD ---assumed care of this patient today   For the patient encounter, I performed the substantive portion of the visit, I reviewed the NP/PA documentation, treatment plan, and medical decision making.  I had face to face time with this patient       Discharge Planning and Disposition: No mobility needs. Ambulating well. Good social support system.   Anticipated discharge    All diagnosis and differential diagnosis have been reviewed; assessment and plan has been documented; I have personally reviewed the labs and test results that are presently available; I have reviewed the patients medication list; I have reviewed the consulting providers response and recommendations. I have reviewed or attempted to review medical  records based upon their availability.    All of the patient and family questions have been addressed and answered. Patient's is agreeable to the above stated plan. I will continue to monitor closely and make adjustments to medical management as needed.      RADHA Jo   07/03/2023

## 2023-07-04 PROBLEM — R20.2 PARESTHESIAS WITH SUBJECTIVE WEAKNESS: Status: ACTIVE | Noted: 2023-07-04

## 2023-07-04 PROBLEM — R53.1 PARESTHESIAS WITH SUBJECTIVE WEAKNESS: Status: ACTIVE | Noted: 2023-07-04

## 2023-07-04 LAB
ANION GAP SERPL CALC-SCNC: 10 MEQ/L
BUN SERPL-MCNC: 14.1 MG/DL (ref 7–18.7)
C3 SERPL-MCNC: 117 MG/DL (ref 80–173)
C4 SERPL-MCNC: 26.3 MG/DL (ref 13–46)
CALCIUM SERPL-MCNC: 9.3 MG/DL (ref 8.4–10.2)
CHLORIDE SERPL-SCNC: 107 MMOL/L (ref 98–107)
CO2 SERPL-SCNC: 22 MMOL/L (ref 22–29)
CREAT SERPL-MCNC: 0.87 MG/DL (ref 0.55–1.02)
CREAT/UREA NIT SERPL: 16
FOLATE SERPL-MCNC: 15.2 NG/ML (ref 7–31.4)
GFR SERPLBLD CREATININE-BSD FMLA CKD-EPI: >60 MLS/MIN/1.73/M2
GLUCOSE SERPL-MCNC: 89 MG/DL (ref 74–100)
HIV 1+2 AB+HIV1 P24 AG SERPL QL IA: NONREACTIVE
MAGNESIUM SERPL-MCNC: 2.2 MG/DL (ref 1.6–2.6)
PHOSPHATE SERPL-MCNC: 3.8 MG/DL (ref 2.3–4.7)
POTASSIUM SERPL-SCNC: 4.4 MMOL/L (ref 3.5–5.1)
SODIUM SERPL-SCNC: 139 MMOL/L (ref 136–145)
T PALLIDUM AB SER QL: NONREACTIVE

## 2023-07-04 PROCEDURE — 83735 ASSAY OF MAGNESIUM: CPT | Performed by: INTERNAL MEDICINE

## 2023-07-04 PROCEDURE — 25000003 PHARM REV CODE 250: Performed by: INTERNAL MEDICINE

## 2023-07-04 PROCEDURE — 80048 BASIC METABOLIC PNL TOTAL CA: CPT | Performed by: INTERNAL MEDICINE

## 2023-07-04 PROCEDURE — 87389 HIV-1 AG W/HIV-1&-2 AB AG IA: CPT | Performed by: INTERNAL MEDICINE

## 2023-07-04 PROCEDURE — 99232 SBSQ HOSP IP/OBS MODERATE 35: CPT | Mod: ,,, | Performed by: SPECIALIST

## 2023-07-04 PROCEDURE — 82746 ASSAY OF FOLIC ACID SERUM: CPT | Performed by: INTERNAL MEDICINE

## 2023-07-04 PROCEDURE — 92610 EVALUATE SWALLOWING FUNCTION: CPT

## 2023-07-04 PROCEDURE — 84100 ASSAY OF PHOSPHORUS: CPT | Performed by: INTERNAL MEDICINE

## 2023-07-04 PROCEDURE — 80074 ACUTE HEPATITIS PANEL: CPT | Performed by: INTERNAL MEDICINE

## 2023-07-04 PROCEDURE — 97166 OT EVAL MOD COMPLEX 45 MIN: CPT

## 2023-07-04 PROCEDURE — C9113 INJ PANTOPRAZOLE SODIUM, VIA: HCPCS | Performed by: INTERNAL MEDICINE

## 2023-07-04 PROCEDURE — 63600175 PHARM REV CODE 636 W HCPCS: Performed by: INTERNAL MEDICINE

## 2023-07-04 PROCEDURE — 99232 PR SUBSEQUENT HOSPITAL CARE,LEVL II: ICD-10-PCS | Mod: ,,, | Performed by: SPECIALIST

## 2023-07-04 PROCEDURE — 21400001 HC TELEMETRY ROOM

## 2023-07-04 PROCEDURE — 97161 PT EVAL LOW COMPLEX 20 MIN: CPT

## 2023-07-04 PROCEDURE — 25000003 PHARM REV CODE 250: Performed by: NURSE PRACTITIONER

## 2023-07-04 PROCEDURE — 63600175 PHARM REV CODE 636 W HCPCS: Performed by: NURSE PRACTITIONER

## 2023-07-04 PROCEDURE — 86780 TREPONEMA PALLIDUM: CPT | Performed by: INTERNAL MEDICINE

## 2023-07-04 RX ORDER — PANTOPRAZOLE SODIUM 40 MG/10ML
40 INJECTION, POWDER, LYOPHILIZED, FOR SOLUTION INTRAVENOUS 2 TIMES DAILY
Status: DISCONTINUED | OUTPATIENT
Start: 2023-07-04 | End: 2023-07-06 | Stop reason: HOSPADM

## 2023-07-04 RX ORDER — PREDNISONE 20 MG/1
20 TABLET ORAL DAILY
Status: DISCONTINUED | OUTPATIENT
Start: 2023-07-05 | End: 2023-07-04

## 2023-07-04 RX ORDER — ONDANSETRON 2 MG/ML
4 INJECTION INTRAMUSCULAR; INTRAVENOUS EVERY 4 HOURS PRN
Status: DISCONTINUED | OUTPATIENT
Start: 2023-07-04 | End: 2023-07-06 | Stop reason: HOSPADM

## 2023-07-04 RX ORDER — PREDNISONE 20 MG/1
20 TABLET ORAL DAILY
Status: DISCONTINUED | OUTPATIENT
Start: 2023-07-04 | End: 2023-07-06 | Stop reason: HOSPADM

## 2023-07-04 RX ORDER — POLYETHYLENE GLYCOL 3350 17 G/17G
17 POWDER, FOR SOLUTION ORAL 2 TIMES DAILY PRN
Status: DISCONTINUED | OUTPATIENT
Start: 2023-07-04 | End: 2023-07-06 | Stop reason: HOSPADM

## 2023-07-04 RX ORDER — MORPHINE SULFATE 4 MG/ML
4 INJECTION, SOLUTION INTRAMUSCULAR; INTRAVENOUS ONCE
Status: COMPLETED | OUTPATIENT
Start: 2023-07-04 | End: 2023-07-04

## 2023-07-04 RX ORDER — MORPHINE SULFATE 4 MG/ML
4 INJECTION, SOLUTION INTRAMUSCULAR; INTRAVENOUS EVERY 4 HOURS PRN
Status: DISCONTINUED | OUTPATIENT
Start: 2023-07-04 | End: 2023-07-05

## 2023-07-04 RX ADMIN — PREDNISONE 20 MG: 20 TABLET ORAL at 09:07

## 2023-07-04 RX ADMIN — HYDROXYCHLOROQUINE SULFATE 200 MG: 200 TABLET, FILM COATED ORAL at 09:07

## 2023-07-04 RX ADMIN — BACLOFEN 5 MG: 5 TABLET ORAL at 03:07

## 2023-07-04 RX ADMIN — OXYCODONE HYDROCHLORIDE 10 MG: 5 TABLET ORAL at 09:07

## 2023-07-04 RX ADMIN — LIDOCAINE PATCH 5% 1 PATCH: 700 PATCH TOPICAL at 08:07

## 2023-07-04 RX ADMIN — MORPHINE SULFATE 4 MG: 4 INJECTION INTRAVENOUS at 06:07

## 2023-07-04 RX ADMIN — BACLOFEN 5 MG: 5 TABLET ORAL at 08:07

## 2023-07-04 RX ADMIN — LEVOTHYROXINE SODIUM 125 MCG: 125 TABLET ORAL at 06:07

## 2023-07-04 RX ADMIN — POLYETHYLENE GLYCOL 3350 17 G: 17 POWDER, FOR SOLUTION ORAL at 08:07

## 2023-07-04 RX ADMIN — HYDROXYCHLOROQUINE SULFATE 200 MG: 200 TABLET, FILM COATED ORAL at 08:07

## 2023-07-04 RX ADMIN — GABAPENTIN 300 MG: 300 CAPSULE ORAL at 03:07

## 2023-07-04 RX ADMIN — GABAPENTIN 300 MG: 300 CAPSULE ORAL at 09:07

## 2023-07-04 RX ADMIN — MORPHINE SULFATE 4 MG: 4 INJECTION INTRAVENOUS at 12:07

## 2023-07-04 RX ADMIN — MORPHINE SULFATE 4 MG: 4 INJECTION INTRAVENOUS at 08:07

## 2023-07-04 RX ADMIN — PANTOPRAZOLE SODIUM 40 MG: 40 INJECTION, POWDER, FOR SOLUTION INTRAVENOUS at 12:07

## 2023-07-04 RX ADMIN — PREDNISONE 20 MG: 20 TABLET ORAL at 02:07

## 2023-07-04 RX ADMIN — MORPHINE SULFATE 4 MG: 4 INJECTION INTRAVENOUS at 04:07

## 2023-07-04 RX ADMIN — PANTOPRAZOLE SODIUM 40 MG: 40 INJECTION, POWDER, FOR SOLUTION INTRAVENOUS at 08:07

## 2023-07-04 RX ADMIN — GABAPENTIN 300 MG: 300 CAPSULE ORAL at 08:07

## 2023-07-04 RX ADMIN — BACLOFEN 5 MG: 5 TABLET ORAL at 12:07

## 2023-07-04 RX ADMIN — RIVAROXABAN 10 MG: 10 TABLET, FILM COATED ORAL at 04:07

## 2023-07-04 RX ADMIN — OXYCODONE HYDROCHLORIDE 10 MG: 5 TABLET ORAL at 01:07

## 2023-07-04 NOTE — PT/OT/SLP EVAL
Occupational Therapy  Evaluation    Name: Davonte Ortega  MRN: [id number]  Admitting Diagnosis: <principal problem not specified>  Recent Surgery: * No surgery found *      Recommendations:     Discharge Recommendations: home  Discharge Equipment Recommendations:  none  Barriers to discharge:  None    Assessment:     Davonte Ortega is a 33 y.o. female with a medical diagnosis of weakness of BLE's  She presents with improved mobility and strength from initial admit but still with c/o some UE tingling/numbness and still with back pain but able to ambulate in room and to BR with HHA/CGA, no LOB. Performance deficits affecting function: weakness, impaired endurance, impaired self care skills, impaired functional mobility.      Rehab Prognosis: Good; patient would benefit from acute skilled OT services to address these deficits and reach maximum level of function.       Plan:     Patient to be seen 3 x/week to address the above listed problems via self-care/home management, therapeutic activities, therapeutic exercises  Plan of Care Expires: 08/01/23  Plan of Care Reviewed with: patient, spouse    Subjective     Chief Complaint: c/o some BUE tingling/numbness  Patient/Family Comments/goals: wants to be back to normal    Occupational Profile:  Living Environment: lives in   home, tub shower and walk in shower  Previous level of function: independent  Roles and Routines: wife  Equipment Used at Home: none  Assistance upon Discharge: yes,  works from home    Pain/Comfort:  Pain Rating 1:  (c/o pain mid lower back, 8/10)  Pain Addressed 1: Reposition, Distraction, Cessation of Activity    Patients cultural, spiritual, Judaism conflicts given the current situation:      Objective:     Communicated with: nstyler prior to session.  Patient found supine with telemetry upon OT entry to room.    General Precautions: Standard, fall  Orthopedic Precautions:    Braces:    Respiratory Status:   Vital Signs:     Occupational  Performance:    Bed Mobility:    Sup to sit with mod I    Functional Mobility/Transfers:  Sit to stand with CGA  Functional Mobility: ambulated in room with HHA/CGA without AD    Activities of Daily Living:  Socks with total assist, u/a to bend to reach feet and u/a to bring LE's into figure 4.  Ambulates to BR with CGA    Cognitive/Visual Perceptual:  intact    Physical Exam:  AROM BUE's grossly wfl, general UE strength grossly 4/5 but per pt: L seems slightly weaker than R, sensation intact    Therapeutic Positioning  Risk for acquired pressure injuries is decreased due to ability to get to BSC/toilet with assist.    OT interventions performed during the course of today's session in an effort to prevent and/or reduce acquired pressure injuries:   Education on Pressure Ulcer Prevention provided    S  OT recommendations for therapeutic positioning throughout hospitalization:   Follow [facility] Pressure Injury Prevention Protocol      [facility] 6 Click ADL:  [facility] Total Score:      Additional Treatment:  As above     Patient Education:  Patient and spouse provided with verbal education regarding OT role/goals/POC, fall prevention, and pressure ulcer prevention.  Understanding was verbalized.     Patient left supine with call button in reach and spouse present    GOALS:   Multidisciplinary Problems       Occupational Therapy Goals          Problem: Occupational Therapy    Goal Priority Disciplines Outcome Interventions   Occupational Therapy Goal     OT, PT/OT Ongoing, Progressing    Description: Goals to be met by: 8/1/23     Patient will increase functional independence with ADLs by performing:    UE Dressing with Modified [place].  LE Dressing with Modified [place].  Grooming while standing with Modified [place].  Toileting from toilet with Modified [place] for hygiene and clothing management.   Toilet transfer to toilet with Modified [place].                         History:     Past Medical  History:   Diagnosis Date    Systemic lupus erythematosus, organ or system involvement unspecified     Ulcerative colitis        History reviewed. No pertinent surgical history.    Time Tracking:     OT Date of Treatment: 07/04/23  OT Start Time: 0836  OT Stop Time: 0852  OT Total Time (min): 16 min    Billable Minutes:Evaluation mod complexity 16 min    7/4/2023

## 2023-07-04 NOTE — NURSING
Nurses Note -- 4 Eyes      7/3/2023   10:00 PM      Skin assessed during: Admit      [x] No Altered Skin Integrity Present    []Prevention Measures Documented      [] Yes- Altered Skin Integrity Present or Discovered   [] LDA Added if Not in Epic (Describe Wound)   [] New Altered Skin Integrity was Present on Admit and Documented in LDA   [] Wound Image Taken    Wound Care Consulted? No    Attending Nurse:  Sonam Chang LPN     Second RN/Staff Member:  Carlos Ríos RN

## 2023-07-04 NOTE — PT/OT/SLP EVAL
Speech Language Pathology Department  Clinical Swallow Evaluation    Patient Name:  Davonte Ortega   MRN:  [id number]    Recommendations:     General recommendations:  SLP intervention not indicated  Diet recommendations:  Regular Diet - IDDSI Level 7, Liquid Diet Level: Thin liquids - IDDSI Level 0   Swallow strategies/precautions: small bites/sips and slow rate  Precautions: Standard,      History:     Davonte Ortega is a/n 33 y.o. female with history of Lupus admitted with numbness. Pt has stomach bug last week, however started to have some tingling and numbness.     Past Medical History:   Diagnosis Date    Systemic lupus erythematosus, organ or system involvement unspecified     Ulcerative colitis        Home Diet: Regular and thin liquids  Current Method of Nutrition: PO diet    Patient complaint: denies any symptoms of dysphagia     Imaging   MRI brain: No intracranial mass or lesion is seen.  No hemorrhage is seen.  No acute infarct is seen.  No diffusion abnormality seen.     Subjective     Patient awake and alert.  Patient goals: to go home    Spiritual/Cultural/Muslim Beliefs/Practices that affect care: no  Pain/Comfort: Pain Rating 1: 0/10    Objective:     ORAL MUSCULATURE  Dentition: own teeth  Secretion Management: adequate  Mucosal Quality: good  Facial Movement: WFL  Buccal Strength & Mobility: WFL  Mandibular Strength & Mobility: WFL  Oral Labial Strength & Mobility: WFL  Lingual Strength & Mobility: WFL  Velar Elevation: WFL  Vocal Quality: adequate  Volitional Cough: Able to clear secretions    Consistency Fed By Oral Symptoms Pharyngeal Symptoms   Thin liquid by cup SLP None None   Thin liquid by straw SLP None None   Puree SLP None None   Chewable solid SLP None None     Assessment:     NO signs/sx of aspiration observed. No skilled SLP intervention is warranted at this time.     Goals:     Multidisciplinary Problems       SLP Goals       Not on file                  Patient Education:      Patient provided with verbal education regarding POC.  Understanding was verbalized.    Plan:     SLP Follow-Up:    7/5/23  Patient to be seen:    7/5/23  Plan of Care reviewed with:  patient      Time Tracking:     SLP Treatment Date:    7/4/23  Speech Start Time:   0745  Speech Stop Time:      0800  Speech Total Time (min):   15    Billable minutes:  Swallow and Oral Function Evaluation, 15 minutes     07/04/2023

## 2023-07-04 NOTE — PLAN OF CARE
Problem: Physical Therapy  Goal: Physical Therapy Goal  Description: Pt will be seen for the following goals:  1. Pt will be ind with all transfers  2. Pt will be ind with gait no AD 200ft  Outcome: Ongoing, Progressing

## 2023-07-04 NOTE — PLAN OF CARE
Problem: Occupational Therapy  Goal: Occupational Therapy Goal  Description: Goals to be met by: 8/1/23     Patient will increase functional independence with ADLs by performing:    UE Dressing with Modified [place].  LE Dressing with Modified [place].  Grooming while standing with Modified [place].  Toileting from toilet with Modified [place] for hygiene and clothing management.   Toilet transfer to toilet with Modified [place].    Outcome: Ongoing, Progressing      n/a

## 2023-07-04 NOTE — PT/OT/SLP EVAL
Physical Therapy Evaluation    Patient Name:  Davonte Ortega   MRN:  [id number]    Recommendations:     Discharge Recommendations: outpatient PT   Discharge Equipment Recommendations: none   Barriers to discharge: None    Assessment:     Davonte Ortega is a 33 y.o. female admitted with a medical diagnosis of parathesias BLE/BUEs  She presents with the following impairments/functional limitations:   weakness BLE R> L, decreased fxn'l mobility.    Rehab Prognosis: Good; patient would benefit from acute skilled PT services to address these deficits and reach maximum level of function.    Recent Surgery: * No surgery found *      Plan:     During this hospitalization, patient to be seen 5 x/week to address the identified rehab impairments via gait training, therapeutic activities, therapeutic exercises and progress toward the following goals:    Plan of Care Expires:  07/11/23    Subjective     Chief Complaint: low back pain, and   Patient/Family Comments/goals:   Pain/Comfort:  Pain Rating 1: 6/10  Location 1: back (she also complains of N/T in her feet)    Patients cultural, spiritual, Oriental orthodox conflicts given the current situation:      Living Environment:  Pt lives with her  in a hose with no steps  Prior to admission, patients level of function was ind.  Equipment used at home: none.  DME owned (not currently used): none.  Upon discharge, patient will have assistance from .    Objective:     Communicated with nurse prior to session.  Patient found supine with telemetry  upon PT entry to room.    General Precautions: Standard, fall  Orthopedic Precautions:N/A   Braces:    Respiratory Status: Room air  Blood Pressure:       Exams:  Cognitive Exam:  Patient is oriented to Person, Place, Time, and Situation  RLE ROM: WFL  RLE Strength: hip is 3+/5, knee is 4-/5, 2+/5 dorsiflexion  LLE ROM: WFL  LLE Strength: 3+/5 hip, 4-/5 knee, 4-/5 dorsiflexion  Skin integrity: Visible skin intact      Functional  Mobility:  Bed Mobility:     Rolling Left:  independence  Rolling Right: independence  Scooting: independence  Supine to Sit: independence  Sit to Supine: independence  Transfers:     Sit to Stand:  contact guard assistance with no AD  Bed to Chair: contact guard assistance with  no AD  using  Step Transfer  Gait: ambulated 75feet with no AD with cga. Pt walked slow and cautious, with slight forward lean and decreased javan step length. She hel herself very stiff during gait as well. Pt stated this was due to back pain which increased to 7/10 during her walk      AM-PAC 6 CLICK MOBILITY  Total Score:20       Treatment & Education:  Pt instructer to work her right amkle on side of bed and supine in bed to increase her right dorsiflexion    Patient provided with verbal education regarding plan of care, and safety with transfers and gait.  Understanding was verbalized.     Patient left supine with all lines intact and call button in reach.    GOALS:   Multidisciplinary Problems       Physical Therapy Goals          Problem: Physical Therapy    Goal Priority Disciplines Outcome Goal Variances Interventions   Physical Therapy Goal     PT, PT/OT Ongoing, Progressing     Description: Pt will be seen for the following goals:  1. Pt will be ind with all transfers  2. Pt will be ind with gait no AD 200ft                       History:     Past Medical History:   Diagnosis Date    Systemic lupus erythematosus, organ or system involvement unspecified     Ulcerative colitis        History reviewed. No pertinent surgical history.    Time Tracking:     PT Received On:    PT Start Time: 0805     PT Stop Time: 0832  PT Total Time (min): 27 min     Billable Minutes: Evaluation 27 07/04/2023

## 2023-07-04 NOTE — PROGRESS NOTES
[facility]  Hospital Medicine Progress Note        Chief Complaint: Numbness (Pt c/o numbness or tingling to bilateral lower extremities. During the week pt had a stomach bug, improved by Friday and then Friday evening pt started to have lower back pain and some numbness then today numbness and tingling has gotten worse. Pt states also decreased muscle strength and difficulty walking. PMH of lupus, UC, Hashimoto's, fibromyalgia, )         Patient information was obtained from patient, patient's family, past medical records and ER records.  ED records were reviewed in detail and documented below    HPI  Pt with h/o of lupus followed locally by Dr. Cazares , ulcerative colitis, Hypothyroidism and fibromyalgia presented to the ED with c/o 2 days h/o ( started from Friday night) paresthesia and motor weakness involving both upper and lower ext ( proximal to distal) and  difficulty ambulating due to muscle weakness. Symptoms started following episodes of gastroenteritis and diarrhea on Thursday that resolved on Friday. Other associated symptoms include low back pain, sensation of pressure in the lower back but no saddle anesthesia , urinary or bowel disturbance. No h/o trauma or fall. Recent mild lupus flare up with onset of rash improved with prednisone therapy. Symptoms are progressive and worsening since onset. MRI brain, C/T/L spine w/wo contrast in the ED today were neg for demyelinating disease. Labs were all unremarkable. UA neg for infection. UDS + amphetamine, Fentanyl, Opiates and bezo, however pt denied use of such except Norco at home. Pt did receive Ativan, morphine, Zofran, Droperidol in the ED could contribute to abnormal UDS.      Symptoms improved somewhat and pt was able to getup and walk to the bathroom after MRI and shortly after receiving Droperidol. Neurology consult obtained and suggested reassess pt in am        PATIENT CURRENTLY BEING managed for paresthesias of  bilateral upper extremity and bilateral lower extremity.  Neurology has assessed patient- does not think the symptoms in keeping with any diagnoses and thinks patient may benefit from psych eval and continued reassurance.  Currently awaiting rheumatology evaluation.  C3-C4 currently normal not in keeping with an SLE flare    Today's information  Patient complaining of so many things low back pain numbness in the left lower extremity and her feet, right greater than left sided weakness.  Reports the morphine helps but has worn off does not think the p.o. oxycodone is helping.   wants is stool studies check for parasites.      Exam  General appearance: Well-developed, well-nourished female lying on ED stretcher, in no apparent distress.  HENT: Atraumatic head. Moist mucous membranes of oral cavity.  Eyes: Normal extraocular movements.   Neck: Supple, no JVD   Lungs: Clear to auscultation bilaterally. No wheezing present.   Heart: Regular rate and rhythm. S1 and S2 present with no murmurs/gallop/rub. Sinus rhythm on monitor. No pedal edema. No JVD present.   Abdomen: Soft, non-distended, non-tender. No rebound tenderness/guarding. Bowel sounds are normal.   Extremities: No cyanosis, clubbing, or edema.  Skin: No Rash.   Neuro: Awake, alert, oriented x 3; no facial droop;speech is clear; Bilateral hand grasps equal; LE strength equal; Motor and sensory exams grossly intact. Good tone. Muscle strength 4/4 in all 4 extremities  Psych/mental status: Appropriate mood and affect. Responds appropriately to questions.          Assessment/Plan  Paresthesia and weakness of extremities of unclear etiology   Recent h/o Viral Gastroenteritis   H/O Lupus, UC, Hypothyroidism and Fibromyalgia        Plan  Neurology assess patient recommends to discharge and follow-up with psych   Currently awaiting rheumatology evaluation   C3-C4 levels and normal does not suggest an SLE flare   Continue pain control   Check for stool studies,  GI panel  Continue PT OT   -Increase home prednisone to 20 mg daily   -Increase home Neurontin to 300 mg tid  -Add Baclofen , Lidoderm patch for back pain    Dispo likely discharge in next 24 hours to home for outpatient physical therapy       VITAL SIGNS: 24 HRS MIN & MAX LAST   Temp  Min: 97.4 °F (36.3 °C)  Max: 98.4 °F (36.9 °C) 97.8 °F (36.6 °C)   BP  Min: 103/69  Max: 134/88 114/75   Pulse  Min: 74  Max: 104  82   Resp  Min: 10  Max: 22 18   SpO2  Min: 98 %  Max: 100 % 99 %     I have reviewed the following labs:    Recent Labs   Lab 07/03/23 0153   WBC 7.54   RBC 5.11   HGB 15.3   HCT 45.3   MCV 88.6   MCH 29.9   MCHC 33.8   RDW 12.0      MPV 10.2       Recent Labs   Lab 07/03/23 0153 07/04/23  0417    139   K 3.9 4.4   CO2 26 22   BUN 8.9 14.1   CREATININE 0.98 0.87   CALCIUM 10.2 9.3   MG 2.00 2.20   ALBUMIN 4.6  --    ALKPHOS 85  --    ALT 9  --    AST 17  --    BILITOT 0.7  --           Microbiology Results (last 7 days)       Procedure Component Value Units Date/Time    Stool Culture [[id number]]     Order Status: Sent Specimen: Stool              See below for Radiology    Scheduled Med:   baclofen  5 mg Oral TID    gabapentin  300 mg Oral TID    hydrOXYchloroQUINE  200 mg Oral BID    levothyroxine  125 mcg Oral Before breakfast    LIDOcaine  1 patch Transdermal Q24H    pantoprazole  40 mg Intravenous BID    rivaroxaban  10 mg Oral Daily with dinner        Continuous Infusions:       PRN Meds:  acetaminophen, morphine, ondansetron, oxyCODONE, oxyCODONE       Assessment/Plan:      VTE prophylaxis:     Patient condition:  Stable/Fair/Guarded/ Serious/ Critical    Anticipated discharge and Disposition:         All diagnosis and differential diagnosis have been reviewed; assessment and plan has been documented; I have personally reviewed the labs and test results that are presently available; I have reviewed the patients medication list; I have reviewed the consulting providers response and  recommendations. I have reviewed or attempted to review medical records based upon their availability    All of the patient's questions have been  addressed and answered. Patient's is agreeable to the above stated plan. I will continue to monitor closely and make adjustments to medical management as needed.  _____________________________________________________________________    Nutrition Status:    Radiology:  I have personally reviewed the following imaging and agree with the radiologist.     MRI Lumbar Spine W WO Cont  Narrative: EXAMINATION:  MRI LUMBAR SPINE W WO CONTRAST    CLINICAL HISTORY:  Demyelinating disease;    TECHNIQUE:  The patient's lumbar spine was examined sagittal and axial planes in T1, T2, stir sequences.  Postcontrast T1 imaging was performed as well.    COMPARISON:  CT scan from 07/03/2023    FINDINGS:  The vertebral body heights and alignment are well maintained.  No fracture seen.  No dislocation is seen.  Cord ends at T12-L1.  Visualized portion of the conus appears grossly unremarkable.  No cord lesion is seen.  No lesion is seen in the cauda equina.  No areas of abnormal enhancement are seen.    No disc bulge or herniation is seen.  No spinal or foraminal stenosis seen.  No significant facet arthropathy is seen.    Paraspinal soft tissues appear grossly unremarkable.  Impression: Normal MRI of the lumbar spine with no evidence of demyelinating process seen    Electronically signed by: Gloria Cerda  Date:    07/03/2023  Time:    14:46  MRI Thoracic Spine W WO Cont  Narrative: EXAMINATION:  MRI THORACIC SPINE W WO CONTRAST    CLINICAL HISTORY:  Demyelinating disease;    TECHNIQUE:  Multiplanar multisequence MR images of the thoracic spine are obtained with and without contrast.    COMPARISON:  None    FINDINGS:  Thoracic alignment is normal.  The vertebral body heights are maintained.  The bone marrow is normal in signal.    There is no cord signal abnormality.  There is no abnormal  cord enhancement.  There is no epidural fluid collection.    There is no disc herniation.  The spinal canal and neural foramina are patent.    The paraspinal soft tissues are unremarkable.  Impression: No cord signal abnormality or evidence of active demyelination.    Electronically signed by: Silvia Serrato  Date:    07/03/2023  Time:    14:45  MRI Brain W WO Contrast  Narrative: EXAMINATION:  MRI BRAIN W WO CONTRAST    CLINICAL HISTORY:  numbness, autoimmune disease;    TECHNIQUE:  Multiplanar multisequence MR imaging of the brain was performed without and with contrast.    COMPARISON:  None available    FINDINGS:  No intracranial mass or lesion is seen.  No hemorrhage is seen.  No acute infarct is seen.  No diffusion abnormality seen.  No parenchymal abnormality is seen.  Gyri and sulci appear normal.  Ventricles and basilar cisterns appear grossly unremarkable.  No abnormal white matter changes are seen..  Posterior fossa appears normal.  Postcontrast imaging demonstrates no evidence of abnormal enhancement.  No mass is seen.  No vascular enhancement seen.  No dural enhancement is seen.  Calvarium is intact.  There is evidence of right maxillary sinusitis  Impression: Right maxillary sinusitis otherwise unremarkable    Electronically signed by: Gloria Cerda  Date:    07/03/2023  Time:    14:41  MRI Cervical Spine W WO Cont  Narrative: EXAMINATION:  MRI CERVICAL SPINE W WO CONTRAST    CLINICAL HISTORY:  Demyelinating disease;Myelopathy, acute;    TECHNIQUE:  Multiplanar, multisequence MR imaging of the cervical spine with and without contrast.    COMPARISON:  None    FINDINGS:  Cervical alignment is normal.  The vertebral body heights are maintained.  The bone marrow is normal signal.    There is no cord signal abnormality.  There is no abnormal cord enhancement.  There is no drainable fluid collection.    A tiny right central disc protrusion at C4-C5 measures less than 2 mm in thickness.  There is no  significant spinal canal or neural foraminal stenosis.    The paraspinal soft tissues are unremarkable.  There is no drainable fluid collection.  Impression: No cord signal abnormality or evidence of active demyelination.    Electronically signed by: Silvia Serrato  Date:    07/03/2023  Time:    14:39  CT Lumbar Spine Without Contrast  Narrative: EXAMINATION:  CT LUMBAR SPINE WITHOUT CONTRAST    CLINICAL HISTORY:  Low back pain, cauda equina syndrome suspected;    TECHNIQUE:  Noncontrast CT images of the lumbar spine. Axial, coronal, and sagittal reformatted images were obtained. Dose length product is 450 mGycm. Automatic exposure control, adjustment of mA/kV or iterative reconstruction technique was used to limit radiation dose.    COMPARISON:  None    FINDINGS:  There are 5 non-rib-bearing lumbar type vertebral bodies.  Alignment is normal.  The vertebral heights are maintained.  There is no acute fracture identified.  There is no evidence of a disc herniation.  The spinal canal and neural foramina are patent.  The paraspinal soft tissues are unremarkable.  Impression: No acute abnormality identified.    Electronically signed by: Silvia Serrato  Date:    07/03/2023  Time:    09:45  CT Head Without Contrast  Narrative: EXAMINATION:  CT HEAD WITHOUT CONTRAST    CLINICAL HISTORY:  weakness/numbness;    TECHNIQUE:  Axial scans were obtained from skull base to the vertex.    Coronal and sagittal reconstructions obtained from the axial data.    Automatic exposure control was utilized to limit radiation dose.    Contrast: None    Radiation Dose:    Total DLP: 923 mGy*cm    COMPARISON:  None    FINDINGS:  There is no acute intracranial hemorrhage or edema. The gray-white matter differentiation is preserved.    There is no mass effect or midline shift. The ventricles and sulci are normal in size. The basal cisterns are patent. There is no abnormal extra-axial fluid collection.    The calvarium and skull base are  intact.  There is a right maxillary sinus mucosal retention cyst.  Impression: No acute intracranial abnormality.    Electronically signed by: Silvia Serrato  Date:    07/03/2023  Time:    09:43      Yves Reyes MD   07/04/2023

## 2023-07-04 NOTE — PROGRESS NOTES
Neurology inpt follow up note    Patient Name: Davonte Ortega  MRN: [id number]  Admission Date: 7/3/2023  Hospital Length of Stay: 1 days  Consulting Provider: Bernabe Lim MD  Primary Care Physician: Charlie Smith MD  Principal Problem:<principal problem not specified>      Subjective:     Chief Complaint:    Chief Complaint   Patient presents with    Numbness     Pt c/o numbness or tingling to bilateral lower extremities. During the week pt had a stomach bug, improved by Friday and then Friday evening pt started to have lower back pain and some numbness then today numbness and tingling has gotten worse. Pt states also decreased muscle strength and difficulty walking. PMH of lupus, UC, Hashimoto's, fibromyalgia,        HPI:   34 y/o female with PMH lupus, ulcerative colitis, fibromyalgia who presented to ED on 7/3 with back pain, numbness, and weakness.  Patient has lupus reportedly in remission and has been off immunosuppressants.  Patient's  at bedside reported he had recent GI virus last week, followed by patient getting it as well, improved by 6/30.  Later that day on 06/30 patient began to have lower back pain near her tailbone with numbness to BLE.  Patient reported taking warm baths really for pain, followed by worsening weakness/numbness to BLE.  Of note, patient is able to ambulate to ED, however, weakness progressed to BUE numbness/weakness and BLE weakness to where she was nonambulatory.  Patient denied bowel/bladder incontinence and saddle anesthesia.  Patient reportedly tearful and unrevealing on initial ED presentation.  She was given morphine and Ativan with improvement of pain and anxiety, but she reported numbness or weakness.  Patient given inapsine in ED followed by improvement of mobility and sensation to BLE.    MRI brain and spine w w/o unrevealing for acute abnormalities or abnormal enhancement. Labs significant for UDS positive for opiates, amphetamine, and fentanyl, otherwise  "unremarkable.        Interim Hx:   .. back pain severe  Paresthesias have returned   R ankle weakness   .    Review of Systems    Current Outpatient Medications   Medication Instructions    gabapentin (NEURONTIN) 100 mg, Oral, 3 times daily    hydrOXYchloroQUINE (PLAQUENIL) 200 mg, Oral, 2 times daily    levothyroxine (SYNTHROID) 125 mcg, Oral, Before breakfast    predniSONE (DELTASONE) 10 mg, Oral, Daily     Objective:      Exam:   /75   Pulse 82   Temp 97.8 °F (36.6 °C) (Oral)   Resp 16   Ht 4' 11" (1.499 m)   Wt 53.5 kg (118 lb)   LMP 06/19/2023 (Approximate)   SpO2 99%   Breastfeeding No   BMI 23.83 kg/m²   ..lying in bed when I entered  [facility] at her side  She sat up at my request and dangled legs off the bed  CN's normal (EOM's, face tongue all ok; puffs cheeks well)  Motor ok in UE ' s and LLE but R ankle not moving normally; unsure how much effort related   KJ's ok and ankle tone symmetric   Toes down or flat   ..    Neuroimaging:  Rads:   Brain MRI: ..unremarkable  C spine MRI: No cord signal abnormality or evidence of active demyelination  T spine MRI: No cord signal abnormality or evidence of active demyelination.  L spine MRI: normal..  Agree Brain C T L spines had not shown acute or concerning pathology     Labs:  Again reviewed         Assessment/Plan:       Active Hospital Problems    Diagnosis    Paresthesias with subjective weakness    Weakness of both lower extremities       Other comments/ follow up:        D/w hospitalist and pt's nurse   [facility]     Bernabe Lim MD MBA    [facility] - 4th Floor Medical Telemetry    "

## 2023-07-05 LAB
ADV 40+41 DNA STL QL NAA+NON-PROBE: NOT DETECTED
ASTRO TYP 1-8 RNA STL QL NAA+NON-PROBE: NOT DETECTED
C CAYETANENSIS DNA STL QL NAA+NON-PROBE: NOT DETECTED
C COLI+JEJ+UPSA DNA STL QL NAA+NON-PROBE: NOT DETECTED
CK SERPL-CCNC: 60 U/L (ref 29–168)
CRYPTOSP DNA STL QL NAA+NON-PROBE: NOT DETECTED
E HISTOLYT DNA STL QL NAA+NON-PROBE: NOT DETECTED
EAEC PAA PLAS AGGR+AATA ST NAA+NON-PRB: NOT DETECTED
EC STX1+STX2 GENES STL QL NAA+NON-PROBE: NOT DETECTED
EPEC EAE GENE STL QL NAA+NON-PROBE: NOT DETECTED
ETEC LTA+ST1A+ST1B TOX ST NAA+NON-PROBE: NOT DETECTED
G LAMBLIA DNA STL QL NAA+NON-PROBE: NOT DETECTED
HAV IGM SERPL QL IA: NONREACTIVE
HBV CORE IGM SERPL QL IA: NONREACTIVE
HBV SURFACE AG SERPL QL IA: NONREACTIVE
HCV AB SERPL QL IA: NONREACTIVE
NOROVIRUS GI+II RNA STL QL NAA+NON-PROBE: NOT DETECTED
P SHIGELLOIDES DNA STL QL NAA+NON-PROBE: NOT DETECTED
RVA RNA STL QL NAA+NON-PROBE: NOT DETECTED
S ENT+BONG DNA STL QL NAA+NON-PROBE: NOT DETECTED
SAPO I+II+IV+V RNA STL QL NAA+NON-PROBE: NOT DETECTED
SHIGELLA SP+EIEC IPAH ST NAA+NON-PROBE: NOT DETECTED
V CHOL+PARA+VUL DNA STL QL NAA+NON-PROBE: NOT DETECTED
V CHOLERAE DNA STL QL NAA+NON-PROBE: NOT DETECTED
Y ENTEROCOL DNA STL QL NAA+NON-PROBE: NOT DETECTED

## 2023-07-05 PROCEDURE — 25000003 PHARM REV CODE 250: Performed by: INTERNAL MEDICINE

## 2023-07-05 PROCEDURE — 21400001 HC TELEMETRY ROOM

## 2023-07-05 PROCEDURE — 83520 IMMUNOASSAY QUANT NOS NONAB: CPT

## 2023-07-05 PROCEDURE — 92523 SPEECH SOUND LANG COMPREHEN: CPT

## 2023-07-05 PROCEDURE — 63600175 PHARM REV CODE 636 W HCPCS: Performed by: INTERNAL MEDICINE

## 2023-07-05 PROCEDURE — 25000003 PHARM REV CODE 250: Performed by: NURSE PRACTITIONER

## 2023-07-05 PROCEDURE — 87045 FECES CULTURE AEROBIC BACT: CPT | Performed by: INTERNAL MEDICINE

## 2023-07-05 PROCEDURE — 99223 PR INITIAL HOSPITAL CARE,LEVL III: ICD-10-PCS | Mod: ,,,

## 2023-07-05 PROCEDURE — 82550 ASSAY OF CK (CPK): CPT

## 2023-07-05 PROCEDURE — 97530 THERAPEUTIC ACTIVITIES: CPT

## 2023-07-05 PROCEDURE — 86235 NUCLEAR ANTIGEN ANTIBODY: CPT

## 2023-07-05 PROCEDURE — 97110 THERAPEUTIC EXERCISES: CPT | Mod: CQ

## 2023-07-05 PROCEDURE — C9113 INJ PANTOPRAZOLE SODIUM, VIA: HCPCS | Performed by: INTERNAL MEDICINE

## 2023-07-05 PROCEDURE — 86225 DNA ANTIBODY NATIVE: CPT

## 2023-07-05 PROCEDURE — 99223 1ST HOSP IP/OBS HIGH 75: CPT | Mod: ,,,

## 2023-07-05 PROCEDURE — 87507 IADNA-DNA/RNA PROBE TQ 12-25: CPT | Performed by: INTERNAL MEDICINE

## 2023-07-05 RX ORDER — OXYCODONE HYDROCHLORIDE 5 MG/1
10 TABLET ORAL EVERY 6 HOURS PRN
Status: DISCONTINUED | OUTPATIENT
Start: 2023-07-05 | End: 2023-07-06 | Stop reason: HOSPADM

## 2023-07-05 RX ORDER — LIDOCAINE 50 MG/G
1 PATCH TOPICAL DAILY
Qty: 30 PATCH | Refills: 0 | OUTPATIENT
Start: 2023-07-05 | End: 2023-08-04

## 2023-07-05 RX ORDER — ZOLPIDEM TARTRATE 5 MG/1
10 TABLET ORAL NIGHTLY PRN
Status: DISCONTINUED | OUTPATIENT
Start: 2023-07-05 | End: 2023-07-06 | Stop reason: HOSPADM

## 2023-07-05 RX ORDER — DULOXETIN HYDROCHLORIDE 30 MG/1
30 CAPSULE, DELAYED RELEASE ORAL DAILY
Status: DISCONTINUED | OUTPATIENT
Start: 2023-07-06 | End: 2023-07-05

## 2023-07-05 RX ORDER — GABAPENTIN 300 MG/1
300 CAPSULE ORAL 3 TIMES DAILY
Qty: 90 CAPSULE | Refills: 11 | OUTPATIENT
Start: 2023-07-05 | End: 2024-07-04

## 2023-07-05 RX ORDER — KETOROLAC TROMETHAMINE 30 MG/ML
30 INJECTION, SOLUTION INTRAMUSCULAR; INTRAVENOUS EVERY 6 HOURS PRN
Status: DISCONTINUED | OUTPATIENT
Start: 2023-07-05 | End: 2023-07-06 | Stop reason: HOSPADM

## 2023-07-05 RX ADMIN — OXYCODONE HYDROCHLORIDE 5 MG: 5 TABLET ORAL at 02:07

## 2023-07-05 RX ADMIN — HYDROXYCHLOROQUINE SULFATE 200 MG: 200 TABLET, FILM COATED ORAL at 08:07

## 2023-07-05 RX ADMIN — LIDOCAINE PATCH 5% 1 PATCH: 700 PATCH TOPICAL at 08:07

## 2023-07-05 RX ADMIN — OXYCODONE HYDROCHLORIDE 10 MG: 5 TABLET ORAL at 09:07

## 2023-07-05 RX ADMIN — GABAPENTIN 300 MG: 300 CAPSULE ORAL at 08:07

## 2023-07-05 RX ADMIN — MORPHINE SULFATE 4 MG: 4 INJECTION INTRAVENOUS at 04:07

## 2023-07-05 RX ADMIN — LEVOTHYROXINE SODIUM 125 MCG: 125 TABLET ORAL at 04:07

## 2023-07-05 RX ADMIN — RIVAROXABAN 10 MG: 10 TABLET, FILM COATED ORAL at 06:07

## 2023-07-05 RX ADMIN — PREDNISONE 20 MG: 20 TABLET ORAL at 08:07

## 2023-07-05 RX ADMIN — GABAPENTIN 300 MG: 300 CAPSULE ORAL at 02:07

## 2023-07-05 RX ADMIN — BACLOFEN 5 MG: 5 TABLET ORAL at 02:07

## 2023-07-05 RX ADMIN — BACLOFEN 5 MG: 5 TABLET ORAL at 08:07

## 2023-07-05 RX ADMIN — POLYETHYLENE GLYCOL 3350 17 G: 17 POWDER, FOR SOLUTION ORAL at 08:07

## 2023-07-05 RX ADMIN — MORPHINE SULFATE 4 MG: 4 INJECTION INTRAVENOUS at 08:07

## 2023-07-05 RX ADMIN — MORPHINE SULFATE 4 MG: 4 INJECTION INTRAVENOUS at 12:07

## 2023-07-05 RX ADMIN — KETOROLAC TROMETHAMINE 30 MG: 30 INJECTION, SOLUTION INTRAMUSCULAR; INTRAVENOUS at 06:07

## 2023-07-05 RX ADMIN — PANTOPRAZOLE SODIUM 40 MG: 40 INJECTION, POWDER, FOR SOLUTION INTRAVENOUS at 08:07

## 2023-07-05 NOTE — SUBJECTIVE & OBJECTIVE
Past Medical History:   Diagnosis Date    Systemic lupus erythematosus, organ or system involvement unspecified     Ulcerative colitis        History reviewed. No pertinent surgical history.      There is no immunization history on file for this patient.    Review of patient's allergies indicates:   Allergen Reactions    Cefaclor Hives and Swelling    Fluconazole Swelling    Levofloxacin Swelling    Sulfa (sulfonamide antibiotics) Swelling    Trimethoprim Swelling     Current Facility-Administered Medications   Medication Frequency    acetaminophen tablet 650 mg Q6H PRN    baclofen tablet 5 mg TID    gabapentin capsule 300 mg TID    hydrOXYchloroQUINE tablet 200 mg BID    levothyroxine tablet 125 mcg Before breakfast    LIDOcaine 5 % patch 1 patch Q24H    ondansetron injection 4 mg Q4H PRN    oxyCODONE immediate release tablet 5 mg Q6H PRN    pantoprazole injection 40 mg BID    polyethylene glycol packet 17 g BID PRN    predniSONE tablet 20 mg Daily    rivaroxaban tablet 10 mg Daily with dinner     Family History    None       Tobacco Use    Smoking status: Not on file    Smokeless tobacco: Not on file   Substance and Sexual Activity    Alcohol use: Not on file    Drug use: Not on file    Sexual activity: Not on file     Review of Systems   Constitutional:  Negative for fever.   HENT: Negative.     Eyes: Negative.    Respiratory: Negative.     Cardiovascular: Negative.    Gastrointestinal: Negative.    Endocrine: Negative.    Genitourinary: Negative.    Musculoskeletal:  Positive for back pain.   Skin: Negative.  Negative for rash.   Neurological:  Positive for weakness and numbness.   Psychiatric/Behavioral: Negative.     Objective:     Vital Signs (Most Recent):  Temp: 98.3 °F (36.8 °C) (07/05/23 1113)  Pulse: 75 (07/05/23 1113)  Resp: 18 (07/05/23 1113)  BP: 120/81 (07/05/23 1113)  SpO2: 98 % (07/05/23 1113) Vital Signs (24h Range):  Temp:  [97.4 °F (36.3 °C)-98.3 °F (36.8 °C)] 98.3 °F (36.8 °C)  Pulse:  [73-77]  75  Resp:  [15-18] 18  SpO2:  [97 %-100 %] 98 %  BP: (113-120)/(73-81) 120/81     Weight: 53.5 kg (118 lb) (07/03/23 0108)  Body mass index is 23.83 kg/m².  Body surface area is 1.49 meters squared.      Intake/Output Summary (Last 24 hours) at 7/5/2023 1233  Last data filed at 7/5/2023 0639  Gross per 24 hour   Intake 810 ml   Output 300 ml   Net 510 ml         Physical Exam   Constitutional: She is oriented to person, place, and time.   HENT:   Head: Normocephalic.   Nose: Nose normal.   Cardiovascular: Normal rate.   Pulmonary/Chest: Effort normal.   Musculoskeletal:         General: No swelling.      Right elbow: Normal.      Left elbow: Normal.      Right wrist: Normal.      Left wrist: Normal.      Right knee: Normal.      Left knee: Normal.   Neurological: She is alert and oriented to person, place, and time. She displays weakness.   Lower extremity weakness   Skin: No rash noted.   Psychiatric: Mood normal.       Right Side Rheumatological Exam     Examination finds the elbow, wrist, knee, 1st PIP, 1st MCP, 2nd PIP, 2nd MCP, 3rd PIP, 3rd MCP, 4th PIP, 4th MCP, 5th PIP and 5th MCP normal.    Left Side Rheumatological Exam     Examination finds the elbow, wrist, knee, 1st PIP, 1st MCP, 2nd PIP, 2nd MCP, 3rd PIP, 3rd MCP, 4th PIP, 4th MCP, 5th PIP and 5th MCP normal.         Significant Labs:  All pertinent lab results from the last 24 hours have been reviewed.    Significant Imaging:  Imaging results within the past 24 hours have been reviewed.

## 2023-07-05 NOTE — PROGRESS NOTES
[facility]  Hospital Medicine Progress Note        Chief Complaint: Numbness (Pt c/o numbness or tingling to bilateral lower extremities. During the week pt had a stomach bug, improved by Friday and then Friday evening pt started to have lower back pain and some numbness then today numbness and tingling has gotten worse. Pt states also decreased muscle strength and difficulty walking. PMH of lupus, UC, Hashimoto's, fibromyalgia, )         Patient information was obtained from patient, patient's family, past medical records and ER records.  ED records were reviewed in detail and documented below     HPI  Pt with h/o of lupus followed locally by Dr. Cazares , ulcerative colitis, Hypothyroidism and fibromyalgia presented to the ED with c/o 2 days h/o ( started from Friday night) paresthesia and motor weakness involving both upper and lower ext ( proximal to distal) and  difficulty ambulating due to muscle weakness. Symptoms started following episodes of gastroenteritis and diarrhea on Thursday that resolved on Friday. Other associated symptoms include low back pain, sensation of pressure in the lower back but no saddle anesthesia , urinary or bowel disturbance. No h/o trauma or fall. Recent mild lupus flare up with onset of rash improved with prednisone therapy. Symptoms are progressive and worsening since onset. MRI brain, C/T/L spine w/wo contrast in the ED today were neg for demyelinating disease. Labs were all unremarkable. UA neg for infection. UDS + amphetamine, Fentanyl, Opiates and bezo, however pt denied use of such except Norco at home. Pt did receive Ativan, morphine, Zofran, Droperidol in the ED could contribute to abnormal UDS.      Symptoms improved somewhat and pt was able to getup and walk to the bathroom after MRI and shortly after receiving Droperidol. Neurology consult obtained and suggested reassess pt in am         PATIENT CURRENTLY BEING managed for paresthesias of  bilateral upper extremity and bilateral lower extremity.  Neurology has assessed patient- does not think the symptoms in keeping with any diagnoses and thinks patient may benefit from psych eval and continued reassurance.  Currently awaiting rheumatology evaluation.  C3-C4 currently normal not in keeping with an SLE flare.      Today's information  Patient and  at bedside   Patient complaining about the neurologist visits   Patient clearing the authenticity of the MRI reports   Patient would like to have Infectious Disease evaluate for possible infection  Patient would like to have a neurologist 2nd opinion  Continues to complain of pain, reports the morphine is not working.   Vitals reviewed and stable   All labs have been normal   MRI of the spine normal with no abnormalities reported.        Exam  General appearance: Well-developed, well-nourished female lying on ED stretcher, in no apparent distress.  HENT: Atraumatic head. Moist mucous membranes of oral cavity.  Eyes: Normal extraocular movements.   Neck: Supple, no JVD   Lungs: Clear to auscultation bilaterally. No wheezing present.   Heart: Regular rate and rhythm. S1 and S2 present with no murmurs/gallop/rub. Sinus rhythm on monitor. No pedal edema. No JVD present.   Abdomen: Soft, non-distended, non-tender. No rebound tenderness/guarding. Bowel sounds are normal.   Extremities: No cyanosis, clubbing, or edema.  Skin: No Rash.   Neuro: Awake, alert, oriented x 3; no facial droop;speech is clear; Bilateral hand grasps equal; LE strength equal; Motor and sensory exams grossly intact. Good tone. Muscle strength 4/4 in all 4 extremities  Psych/mental status: Appropriate mood and affect. Responds appropriately to questions.            Assessment/Plan  Paresthesia and weakness of extremities of unclear etiology   Recent h/o Viral Gastroenteritis   H/O Lupus, UC, Hypothyroidism and Fibromyalgia         Plan  I spoke with the radiologist and he read through  the reports again while I was on the phone.  He requests that she can sending her previous reports and he can compare them but now the MRI is normal.    Neurology on call today is still the same neurologist yesterday, as such can not get a 2nd opinion today   Id has been consulted follow-up recommendations   Rheumatology evaluated patient concern for fibromyalgia   Neurology assess patient recommends to discharge and follow-up with psych   C3-C4 levels and normal does not suggest an SLE flare   Continue pain control   Check for stool studies, GI panel- as requested by , concerned about a parasite , f/up   Continue PT OT   -continue  home prednisone to 10 mg daily   -Increase home Neurontin to 300 mg tid  -Add Baclofen , Lidoderm patch for back pain     Dispo likely discharge in next 24 hours to home for outpatient physical therapy - pending ID eval        VITAL SIGNS: 24 HRS MIN & MAX LAST   Temp  Min: 97.4 °F (36.3 °C)  Max: 98.3 °F (36.8 °C) 98.3 °F (36.8 °C)   BP  Min: 113/73  Max: 120/81 120/81   Pulse  Min: 73  Max: 77  75   Resp  Min: 15  Max: 18 18   SpO2  Min: 97 %  Max: 100 % 98 %     I have reviewed the following labs:    Recent Labs   Lab 07/03/23 0153   WBC 7.54   RBC 5.11   HGB 15.3   HCT 45.3   MCV 88.6   MCH 29.9   MCHC 33.8   RDW 12.0      MPV 10.2       Recent Labs   Lab 07/03/23 0153 07/04/23  0417    139   K 3.9 4.4   CO2 26 22   BUN 8.9 14.1   CREATININE 0.98 0.87   CALCIUM 10.2 9.3   MG 2.00 2.20   ALBUMIN 4.6  --    ALKPHOS 85  --    ALT 9  --    AST 17  --    BILITOT 0.7  --           Microbiology Results (last 7 days)       Procedure Component Value Units Date/Time    Stool Culture [[id number]] Collected: 07/05/23 0839    Order Status: Sent Specimen: Stool Updated: 07/05/23 0911             See below for Radiology    Scheduled Med:   baclofen  5 mg Oral TID    [START ON 7/6/2023] DULoxetine  30 mg Oral Daily    gabapentin  300 mg Oral TID    hydrOXYchloroQUINE  200 mg  Oral BID    levothyroxine  125 mcg Oral Before breakfast    LIDOcaine  1 patch Transdermal Q24H    pantoprazole  40 mg Intravenous BID    predniSONE  20 mg Oral Daily    rivaroxaban  10 mg Oral Daily with dinner        Continuous Infusions:       PRN Meds:  acetaminophen, ondansetron, oxyCODONE, polyethylene glycol, zolpidem       Assessment/Plan:      VTE prophylaxis:     Patient condition:  Stable/Fair/Guarded/ Serious/ Critical    Anticipated discharge and Disposition:         All diagnosis and differential diagnosis have been reviewed; assessment and plan has been documented; I have personally reviewed the labs and test results that are presently available; I have reviewed the patients medication list; I have reviewed the consulting providers response and recommendations. I have reviewed or attempted to review medical records based upon their availability    All of the patient's questions have been  addressed and answered. Patient's is agreeable to the above stated plan. I will continue to monitor closely and make adjustments to medical management as needed.  _____________________________________________________________________    Nutrition Status:    Radiology:  I have personally reviewed the following imaging and agree with the radiologist.     MRI Lumbar Spine W WO Cont  Narrative: EXAMINATION:  MRI LUMBAR SPINE W WO CONTRAST    CLINICAL HISTORY:  Demyelinating disease;    TECHNIQUE:  The patient's lumbar spine was examined sagittal and axial planes in T1, T2, stir sequences.  Postcontrast T1 imaging was performed as well.    COMPARISON:  CT scan from 07/03/2023    FINDINGS:  The vertebral body heights and alignment are well maintained.  No fracture seen.  No dislocation is seen.  Cord ends at T12-L1.  Visualized portion of the conus appears grossly unremarkable.  No cord lesion is seen.  No lesion is seen in the cauda equina.  No areas of abnormal enhancement are seen.    No disc bulge or herniation is seen.   No spinal or foraminal stenosis seen.  No significant facet arthropathy is seen.    Paraspinal soft tissues appear grossly unremarkable.  Impression: Normal MRI of the lumbar spine with no evidence of demyelinating process seen    Electronically signed by: Gloria Cerda  Date:    07/03/2023  Time:    14:46  MRI Thoracic Spine W WO Cont  Narrative: EXAMINATION:  MRI THORACIC SPINE W WO CONTRAST    CLINICAL HISTORY:  Demyelinating disease;    TECHNIQUE:  Multiplanar multisequence MR images of the thoracic spine are obtained with and without contrast.    COMPARISON:  None    FINDINGS:  Thoracic alignment is normal.  The vertebral body heights are maintained.  The bone marrow is normal in signal.    There is no cord signal abnormality.  There is no abnormal cord enhancement.  There is no epidural fluid collection.    There is no disc herniation.  The spinal canal and neural foramina are patent.    The paraspinal soft tissues are unremarkable.  Impression: No cord signal abnormality or evidence of active demyelination.    Electronically signed by: Silvia Serrato  Date:    07/03/2023  Time:    14:45  MRI Brain W WO Contrast  Narrative: EXAMINATION:  MRI BRAIN W WO CONTRAST    CLINICAL HISTORY:  numbness, autoimmune disease;    TECHNIQUE:  Multiplanar multisequence MR imaging of the brain was performed without and with contrast.    COMPARISON:  None available    FINDINGS:  No intracranial mass or lesion is seen.  No hemorrhage is seen.  No acute infarct is seen.  No diffusion abnormality seen.  No parenchymal abnormality is seen.  Gyri and sulci appear normal.  Ventricles and basilar cisterns appear grossly unremarkable.  No abnormal white matter changes are seen..  Posterior fossa appears normal.  Postcontrast imaging demonstrates no evidence of abnormal enhancement.  No mass is seen.  No vascular enhancement seen.  No dural enhancement is seen.  Calvarium is intact.  There is evidence of right maxillary  sinusitis  Impression: Right maxillary sinusitis otherwise unremarkable    Electronically signed by: Gloria Cerda  Date:    07/03/2023  Time:    14:41  MRI Cervical Spine W WO Cont  Narrative: EXAMINATION:  MRI CERVICAL SPINE W WO CONTRAST    CLINICAL HISTORY:  Demyelinating disease;Myelopathy, acute;    TECHNIQUE:  Multiplanar, multisequence MR imaging of the cervical spine with and without contrast.    COMPARISON:  None    FINDINGS:  Cervical alignment is normal.  The vertebral body heights are maintained.  The bone marrow is normal signal.    There is no cord signal abnormality.  There is no abnormal cord enhancement.  There is no drainable fluid collection.    A tiny right central disc protrusion at C4-C5 measures less than 2 mm in thickness.  There is no significant spinal canal or neural foraminal stenosis.    The paraspinal soft tissues are unremarkable.  There is no drainable fluid collection.  Impression: No cord signal abnormality or evidence of active demyelination.    Electronically signed by: Silvia Serrato  Date:    07/03/2023  Time:    14:39  CT Lumbar Spine Without Contrast  Narrative: EXAMINATION:  CT LUMBAR SPINE WITHOUT CONTRAST    CLINICAL HISTORY:  Low back pain, cauda equina syndrome suspected;    TECHNIQUE:  Noncontrast CT images of the lumbar spine. Axial, coronal, and sagittal reformatted images were obtained. Dose length product is 450 mGycm. Automatic exposure control, adjustment of mA/kV or iterative reconstruction technique was used to limit radiation dose.    COMPARISON:  None    FINDINGS:  There are 5 non-rib-bearing lumbar type vertebral bodies.  Alignment is normal.  The vertebral heights are maintained.  There is no acute fracture identified.  There is no evidence of a disc herniation.  The spinal canal and neural foramina are patent.  The paraspinal soft tissues are unremarkable.  Impression: No acute abnormality identified.    Electronically signed by: Silvia  Ama  Date:    07/03/2023  Time:    09:45  CT Head Without Contrast  Narrative: EXAMINATION:  CT HEAD WITHOUT CONTRAST    CLINICAL HISTORY:  weakness/numbness;    TECHNIQUE:  Axial scans were obtained from skull base to the vertex.    Coronal and sagittal reconstructions obtained from the axial data.    Automatic exposure control was utilized to limit radiation dose.    Contrast: None    Radiation Dose:    Total DLP: 923 mGy*cm    COMPARISON:  None    FINDINGS:  There is no acute intracranial hemorrhage or edema. The gray-white matter differentiation is preserved.    There is no mass effect or midline shift. The ventricles and sulci are normal in size. The basal cisterns are patent. There is no abnormal extra-axial fluid collection.    The calvarium and skull base are intact.  There is a right maxillary sinus mucosal retention cyst.  Impression: No acute intracranial abnormality.    Electronically signed by: Silvia Serrato  Date:    07/03/2023  Time:    09:43      Yves Reyes MD   07/05/2023

## 2023-07-05 NOTE — PT/OT/SLP EVAL
"Speech Language Pathology Department  Cognitive-Communication Evaluation    Patient Name:  Davonte Ortega   MRN:  [id number]    Recommendations:     General recommendations:  SLP intervention not indicated    Barriers to safe discharge: none  Precautions: Standard,      History:     Davonte Ortega is a/n 33 y.o. female admitted to [facility] on 7/3/23 following c/o of numbness or tingling to bilateral lower extremities. Pt with h/o of lupus followed locally by Dr. Cazares , ulcerative colitis, Hypothyroidism and fibromyalgia. MRI brain and spine w w/o unrevealing for acute abnormalities or abnormal enhancement. Labs significant for UDS positive for opiates, amphetamine, and fentanyl, otherwise unremarkable.    Past Medical History:   Diagnosis Date    Systemic lupus erythematosus, organ or system involvement unspecified     Ulcerative colitis      History reviewed. No pertinent surgical history.    Previous level of Function  Education:some college  Occupation: unemployed, disabled  Lives: with spouse  Handed: Right  Glasses: no  Hearing Aids: no  Home Responsibilities: drives, financial management, medication/health management, laundry, shopping, meal preparation, and cleaning      Subjective     Patient oriented x4, calm, and cooperative.    Patient goals: "to find out what is wrong with my back"     Spiritual/Cultural/Anabaptist Beliefs/Practices that affect care: no  Pain/Comfort: Pain Rating 1: 0/10  Respiratory Status: room air    Objective:     ORAL MUSCULATURE  Dentition: own teeth  Facial Movement: WFL  Buccal Strength & Mobility: WFL  Mandibular Strength & Mobility: WFL  Oral Labial Strength & Mobility: WFL  Lingual Strength & Mobility: WFL    SPEECH PRODUCTION  Phoneme Production: adequate  Voice Quality: adequate  Voice Production: adequate  Speech Rate: appropriate  Loudness: acceptable  Respiration: WFL for speech  Resonance: adequate  Prosody: adequate  Speech Intelligibility  Known Context: Greater that " 90%  Unknown Context: Greater that 90%    AUDITORY COMPREHENSION  Identification:  Objects: 100  Following Directions:  1-Step: 100  2-Step: 100  3-Step: 100  Yes/No Questions:  Biographical: 100  Environmental: 100  Simple: 100  Complex: 100    VERBAL EXPRESSION    Phrase Completion: 100%  Confrontation Naming  Objects: 100  Wh- Questions:  Object name: 100  Object function: 100  Complex: 100    COGNITION  Orientation:  Person: yes  Place: yes  Time: yes  Situation: yes   Attention:  Focused: WNL  Pragmatics:  Eye contact: WNL  Facial expression: WNL  Communicative Intent: WNL  Topic Maintenance: WNL  Response Length: WNL  Completeness: WNL  Memory:  Immediate: WNL  Delayed: WNL  Short Term: WNL  Long Term: WNL  Problem Solving  Functional simple: WNL  Functional complex: WNL  Organization:  Convergent thinking: WNL  Divergent thinking: WNL  Executive Function:  Reasoning: WNL    Assessment:     Patient's cognitive linguistic function is WNL. No SLP services warranted at this time.       Patient Education:     Patient and spouse provided with verbal education regarding patient's LOF and POC.  Understanding was verbalized.    Plan:     SLP Follow-Up:   No  Plan of Care reviewed with:  patient and patient's spouse     Time Tracking:     SLP Treatment Date:   07/05/23  Speech Start Time:  0910  Speech Stop Time:  0930     Speech Total Time (min):  20 min    Billable minutes:  Evaluation of Speech Sound Production with Comprehension and Expression, 20 minutes     07/05/2023

## 2023-07-05 NOTE — PT/OT/SLP PROGRESS
"Occupational Therapy   Treatment    Name: Davonte Ortega  MRN: [id number]  Admitting Diagnosis:  Weakness of both lower extremities       Recommendations:     Discharge Recommendations: home  Discharge Equipment Recommendations:  none  Barriers to discharge:  None    Assessment:     Davonte Ortega is a 33 y.o. female with a medical diagnosis of Weakness of both lower extremities.  She presents with significant c/o pain and "pressure" in back, and weakness/fatigue in arms and legs, but still able to ambulate to BR without assist. Performance deficits affecting function are weakness, impaired endurance, impaired self care skills, impaired functional mobility.     Rehab Prognosis:  Fair; patient would benefit from acute skilled OT services to address these deficits and reach maximum level of function.       Plan:     Patient to be seen 3 x/week to address the above listed problems via self-care/home management, therapeutic activities, therapeutic exercises  Plan of Care Expires: 08/01/23  Plan of Care Reviewed with: patient, spouse    Subjective     Pain/Comfort:  Pain Rating 1: 8/10 (mid lower back)  Location 1: back  Pain Addressed 1: Reposition, Distraction    Objective:     Communicated with: nstyler prior to session.  Patient found  up in BR   with telemetry upon OT entry to room.    General Precautions: Standard, fall    Orthopedic Precautions:   Braces:    Respiratory Status:   Vital Signs:      Occupational Performance:     Bed Mobility:    Sit to sup with mod I     Functional Mobility/Transfers:  Ambulated in room with RW with spvn/min cues, reports she feels more stable with the walker but would like to not have to use one.  Functional Mobility: ambulating from BR without AD with spvn    Activities of Daily Living:  Toileting with mod I hygiene   reports pt showered last night with min assist from him    Therapeutic Activities:  As above     Therapeutic Exercise:      Therapeutic Positioning        [facility] 6 " Click ADL:      Patient Education:  Patient and spouse provided with verbal education regarding OT role/goals/POC and Discharge/DME recommendations for possible walker use If needed to  increase stability  Understanding was verbalized.      Patient left supine with all lines intact, call button in reach, and  present    GOALS:   Multidisciplinary Problems       Occupational Therapy Goals          Problem: Occupational Therapy    Goal Priority Disciplines Outcome Interventions   Occupational Therapy Goal     OT, PT/OT Ongoing, Progressing    Description: Goals to be met by: 8/1/23     Patient will increase functional independence with ADLs by performing:    UE Dressing with Modified Ritchie.  LE Dressing with Modified Ritchie.  Grooming while standing with Modified Ritchie.  Toileting from toilet with Modified Ritchie for hygiene and clothing management.   Toilet transfer to toilet with Modified Ritchie.                         Time Tracking:     OT Date of Treatment: 07/05/23  OT Start Time: 0927  OT Stop Time: 0950  OT Total Time (min): 23 min    Billable Minutes:Therapeutic Activity 23 min    OT/JENS: OT     Number of JENS visits since last OT visit: 1 7/5/2023

## 2023-07-05 NOTE — ASSESSMENT & PLAN NOTE
Bilateral weakness. Imaging reviewed. MRI negative for vasculitis or demyelinating disease. C3 C4 normal, CRP normal, urinalysis negative for protein. Hx of Lupus (diagnosed when 19 y/o) followed by Dr. Cazares. Currently on HCQ and prednisone. We plan to order Rheumatological labs and maximize treatment for fibromyalgia.

## 2023-07-05 NOTE — HPI
Ms. Ortega is a 32 y/o female that presented to the ED with chief complaints of: 2 days h/o ( started from Friday night) paresthesia and motor weakness involving both upper and lower ext ( proximal to distal) and  difficulty ambulating due to muscle weakness. She has hx of Lupus, diagnosed when she was 20 years old. She also has hx of fibromyalgiaShe is established with local Rheumatologist, Dr. Cazares. She is currently on hydroxychloroquine and reports her Lupus has been in remission for years. She does reports a lupus flare rash that she had rash on bilateral extremities that resolved in days. She does report recent gastroenteritis/diarrhea stomach bug that resolved before presenting to ED. Other associated symptoms include low back pain, sensation of pressure in the lower back but no saddle anesthesia  MRI brain, C/T/L spine w/wo contrast were completed and negative for demyelinating disease. MRI of C,T, and L spine were essentially normal. Neurology was consulted and as of now signed off.  Labs: C3 and C4 normal    Consult: lupus; BUE & BLE weakness and numbness

## 2023-07-05 NOTE — CONSULTS
[facility] - 4th Floor Medical Telemetry  Rheumatology  Consult Note    Patient Name: Davonte Ortega  MRN: [id number]  Admission Date: 7/3/2023  Hospital Length of Stay: 2 days  Code Status: No Order   Attending Provider: Celina Lopes MD  Primary Care Physician: Charlie Smith MD  Principal Problem:Weakness of both lower extremities    Consults  Subjective:     HPI: Ms. Ortega is a 34 y/o female that presented to the ED with chief complaints of: 2 days h/o ( started from Friday night) paresthesia and motor weakness involving both upper and lower ext ( proximal to distal) and  difficulty ambulating due to muscle weakness. She has hx of Lupus, diagnosed when she was 20 years old. She also has hx of fibromyalgiaShe is established with local Rheumatologist, Dr. Cazares. She is currently on hydroxychloroquine and reports her Lupus has been in remission for years. She does reports a lupus flare rash that she had rash on bilateral extremities that resolved in days. She does report recent gastroenteritis/diarrhea stomach bug that resolved before presenting to ED. Other associated symptoms include low back pain, sensation of pressure in the lower back but no saddle anesthesia  MRI brain, C/T/L spine w/wo contrast were completed and negative for demyelinating disease. Neurology was consulted and as of now signed off.  Labs: C3 and C4 normal    Consult: lupus; BUE & BLE weakness and numbness      Past Medical History:   Diagnosis Date    Systemic lupus erythematosus, organ or system involvement unspecified     Ulcerative colitis        History reviewed. No pertinent surgical history.      There is no immunization history on file for this patient.    Review of patient's allergies indicates:   Allergen Reactions    Cefaclor Hives and Swelling    Fluconazole Swelling    Levofloxacin Swelling    Sulfa (sulfonamide antibiotics) Swelling    Trimethoprim Swelling     Current Facility-Administered Medications    Medication Frequency    acetaminophen tablet 650 mg Q6H PRN    baclofen tablet 5 mg TID    gabapentin capsule 300 mg TID    hydrOXYchloroQUINE tablet 200 mg BID    levothyroxine tablet 125 mcg Before breakfast    LIDOcaine 5 % patch 1 patch Q24H    ondansetron injection 4 mg Q4H PRN    oxyCODONE immediate release tablet 5 mg Q6H PRN    pantoprazole injection 40 mg BID    polyethylene glycol packet 17 g BID PRN    predniSONE tablet 20 mg Daily    rivaroxaban tablet 10 mg Daily with dinner     Family History    None       Tobacco Use    Smoking status: Not on file    Smokeless tobacco: Not on file   Substance and Sexual Activity    Alcohol use: Not on file    Drug use: Not on file    Sexual activity: Not on file     Review of Systems   Constitutional:  Negative for fever.   HENT: Negative.     Eyes: Negative.    Respiratory: Negative.     Cardiovascular: Negative.    Gastrointestinal: Negative.    Endocrine: Negative.    Genitourinary: Negative.    Musculoskeletal:  Positive for back pain.   Skin: Negative.  Negative for rash.   Neurological:  Positive for weakness and numbness.   Psychiatric/Behavioral: Negative.     Objective:     Vital Signs (Most Recent):  Temp: 98.3 °F (36.8 °C) (07/05/23 1113)  Pulse: 75 (07/05/23 1113)  Resp: 18 (07/05/23 1113)  BP: 120/81 (07/05/23 1113)  SpO2: 98 % (07/05/23 1113) Vital Signs (24h Range):  Temp:  [97.4 °F (36.3 °C)-98.3 °F (36.8 °C)] 98.3 °F (36.8 °C)  Pulse:  [73-77] 75  Resp:  [15-18] 18  SpO2:  [97 %-100 %] 98 %  BP: (113-120)/(73-81) 120/81     Weight: 53.5 kg (118 lb) (07/03/23 0108)  Body mass index is 23.83 kg/m².  Body surface area is 1.49 meters squared.      Intake/Output Summary (Last 24 hours) at 7/5/2023 1233  Last data filed at 7/5/2023 0639  Gross per 24 hour   Intake 810 ml   Output 300 ml   Net 510 ml         Physical Exam   Constitutional: She is oriented to person, place, and time.   HENT:   Head: Normocephalic.   Nose: Nose normal.    Cardiovascular: Normal rate.   Pulmonary/Chest: Effort normal.   Musculoskeletal:         General: No swelling.      Right elbow: Normal.      Left elbow: Normal.      Right wrist: Normal.      Left wrist: Normal.      Right knee: Normal.      Left knee: Normal.   Neurological: She is alert and oriented to person, place, and time. She displays weakness.   Lower extremity weakness   Skin: No rash noted.   Psychiatric: Mood normal.       Right Side Rheumatological Exam     Examination finds the elbow, wrist, knee, 1st PIP, 1st MCP, 2nd PIP, 2nd MCP, 3rd PIP, 3rd MCP, 4th PIP, 4th MCP, 5th PIP and 5th MCP normal.    Left Side Rheumatological Exam     Examination finds the elbow, wrist, knee, 1st PIP, 1st MCP, 2nd PIP, 2nd MCP, 3rd PIP, 3rd MCP, 4th PIP, 4th MCP, 5th PIP and 5th MCP normal.         Significant Labs:  All pertinent lab results from the last 24 hours have been reviewed.    Significant Imaging:  Imaging results within the past 24 hours have been reviewed.    Assessment/Plan:     Orthopedic  * Weakness of both lower extremities  Bilateral weakness. Imaging reviewed. MRI negative for vasculitis or demyelinating disease. C3 C4 normal, CRP normal, urinalysis negative for protein. Neurology has now signed off with recommendations of discharge and pyherbie eval. Hx of Lupus (diagnosed when 21 y/o) followed by Dr. Cazares. Currently on HCQ and prednisone. We plan to order Rheumatological labs and maximize treatment for fibromyalgia.             Thank you for your consult. I will follow-up with patient. Please contact us if you have any additional questions.    Kajal Major, AMEE  Rheumatology  [facility] - 4th Floor Medical Telemetry

## 2023-07-05 NOTE — PT/OT/SLP PROGRESS
Physical Therapy Treatment    Patient Name:  Davonte Ortega   MRN:  [id number]    Recommendations:     Discharge Recommendations: home with home health  Discharge Equipment Recommendations: none  Barriers to discharge:     Assessment:     Davonte Ortega is a 33 y.o. female admitted with a medical diagnosis of Weakness of both lower extremities.  She presents with the following impairments/functional limitations: weakness, gait instability, impaired endurance, impaired sensation, impaired self care skills, impaired functional mobility.    Rehab Prognosis: Good; patient would benefit from acute skilled PT services to address these deficits and reach maximum level of function.    Recent Surgery: * No surgery found *      Plan:     During this hospitalization, patient to be seen 5 x/week to address the identified rehab impairments via gait training, therapeutic activities, therapeutic exercises and progress toward the following goals:    Plan of Care Expires:  07/11/23    Subjective     Chief Complaint: Pt c/o low back pain and did not want to ambulate. Pt states that she has been waiting for her pain meds, but pain meds need to be updated.   Patient/Family Comments/goals:   Pain/Comfort:         Objective:     Communicated with NSG prior to session.  Patient found supine with telemetry upon PTA entry to room.     General Precautions: Standard, fall  Orthopedic Precautions: N/A  Braces: N/A  Respiratory Status: Room air  Blood Pressure:   Skin Integrity: Visible skin intact      Functional Mobility:  Bed: SBA supine <-> sit EOB  T/F: SBA sit <-> stand with RW     Therapeutic Activities/Exercises:  B LE therex x 10 reps: hip flexion, hip abd, hip add, heel slides, ankle pumps. Pt with more difficulty on R LE.     Education:  Patient provided with verbal education regarding safety.  Understanding was verbalized.     Patient left supine with all lines intact and call button in reach..    GOALS:   Multidisciplinary Problems        Physical Therapy Goals          Problem: Physical Therapy    Goal Priority Disciplines Outcome Goal Variances Interventions   Physical Therapy Goal     PT, PT/OT Ongoing, Progressing     Description: Pt will be seen for the following goals:  1. Pt will be ind with all transfers  2. Pt will be ind with gait no AD 200ft                       Time Tracking:     PT Received On:    PT Start Time: 1325     PT Stop Time: 1335  PT Total Time (min): 10 min     Billable Minutes: Therapeutic Exercise 10    Treatment Type: Treatment  PT/PTA: PTA     Number of PTA visits since last PT visit: 1 07/05/2023

## 2023-07-06 VITALS
RESPIRATION RATE: 18 BRPM | WEIGHT: 118 LBS | HEIGHT: 59 IN | SYSTOLIC BLOOD PRESSURE: 125 MMHG | BODY MASS INDEX: 23.79 KG/M2 | TEMPERATURE: 98 F | DIASTOLIC BLOOD PRESSURE: 74 MMHG | OXYGEN SATURATION: 97 % | HEART RATE: 71 BPM

## 2023-07-06 LAB
ANTINUCLEAR ANTIBODY SCREEN (OHS): NEGATIVE
APTT PPP: 34.3 SECONDS (ref 23.2–33.7)
CENTROMERE QUANT (OHS): 3.3 U/ML
DSDNA AB QUANT (OHS): 1.3 IU/ML
GBM QUANT (OHS): <1.5 U/ML
INR BLD: 1.1 (ref 0–1.3)
JO-1 AB QUANT (OHS): <0.3 U/ML
MPO QUANT (OLG): <0.2 U/ML
PATH REV: NORMAL
PLATELET # BLD AUTO: 353 X10(3)/MCL (ref 130–400)
PR3 QUANT (OHS): <0.6 U/ML
PROTHROMBIN TIME: 14.1 SECONDS (ref 12.5–14.5)
RNP70 AB QUANT (OHS): 2.1 U/ML
SCL-70S AB QUANT (OHS): <0.6 U/ML
SMITH AB QUANT (OHS): <0.8 U/ML
SSA(RO) AB QUANT (OHS): <0.4 U/ML
SSB(LA) AB QUANT (OHS): <0.4 U/ML
U1RNP AB QUANT (OHS): 1.1 U/ML

## 2023-07-06 PROCEDURE — 25000003 PHARM REV CODE 250: Performed by: NURSE PRACTITIONER

## 2023-07-06 PROCEDURE — 99233 SBSQ HOSP IP/OBS HIGH 50: CPT | Mod: ,,,

## 2023-07-06 PROCEDURE — 85049 AUTOMATED PLATELET COUNT: CPT | Performed by: INTERNAL MEDICINE

## 2023-07-06 PROCEDURE — 25000003 PHARM REV CODE 250

## 2023-07-06 PROCEDURE — 97116 GAIT TRAINING THERAPY: CPT | Mod: CQ

## 2023-07-06 PROCEDURE — 99222 1ST HOSP IP/OBS MODERATE 55: CPT | Mod: ,,, | Performed by: NURSE PRACTITIONER

## 2023-07-06 PROCEDURE — 25000003 PHARM REV CODE 250: Performed by: INTERNAL MEDICINE

## 2023-07-06 PROCEDURE — 92523 SPEECH SOUND LANG COMPREHEN: CPT

## 2023-07-06 PROCEDURE — 85730 THROMBOPLASTIN TIME PARTIAL: CPT | Performed by: INTERNAL MEDICINE

## 2023-07-06 PROCEDURE — 63600175 PHARM REV CODE 636 W HCPCS: Performed by: INTERNAL MEDICINE

## 2023-07-06 PROCEDURE — 99223 PR INITIAL HOSPITAL CARE,LEVL III: ICD-10-PCS | Mod: ,,, | Performed by: PSYCHIATRY & NEUROLOGY

## 2023-07-06 PROCEDURE — 85610 PROTHROMBIN TIME: CPT | Performed by: INTERNAL MEDICINE

## 2023-07-06 PROCEDURE — 99222 PR INITIAL HOSPITAL CARE,LEVL II: ICD-10-PCS | Mod: ,,, | Performed by: NURSE PRACTITIONER

## 2023-07-06 PROCEDURE — C9113 INJ PANTOPRAZOLE SODIUM, VIA: HCPCS | Performed by: INTERNAL MEDICINE

## 2023-07-06 PROCEDURE — 86053 AQAPRN-4 ANTB FLO CYTMTRY EA: CPT | Performed by: PSYCHIATRY & NEUROLOGY

## 2023-07-06 PROCEDURE — 99223 1ST HOSP IP/OBS HIGH 75: CPT | Mod: ,,, | Performed by: PSYCHIATRY & NEUROLOGY

## 2023-07-06 PROCEDURE — 99233 PR SUBSEQUENT HOSPITAL CARE,LEVL III: ICD-10-PCS | Mod: ,,,

## 2023-07-06 RX ADMIN — PREDNISONE 20 MG: 20 TABLET ORAL at 08:07

## 2023-07-06 RX ADMIN — GABAPENTIN 300 MG: 300 CAPSULE ORAL at 03:07

## 2023-07-06 RX ADMIN — OXYCODONE HYDROCHLORIDE 10 MG: 5 TABLET ORAL at 06:07

## 2023-07-06 RX ADMIN — GABAPENTIN 300 MG: 300 CAPSULE ORAL at 08:07

## 2023-07-06 RX ADMIN — OXYCODONE HYDROCHLORIDE 10 MG: 5 TABLET ORAL at 12:07

## 2023-07-06 RX ADMIN — PANTOPRAZOLE SODIUM 40 MG: 40 INJECTION, POWDER, FOR SOLUTION INTRAVENOUS at 08:07

## 2023-07-06 RX ADMIN — ZOLPIDEM TARTRATE 10 MG: 5 TABLET ORAL at 12:07

## 2023-07-06 RX ADMIN — LEVOTHYROXINE SODIUM 125 MCG: 125 TABLET ORAL at 06:07

## 2023-07-06 RX ADMIN — HYDROXYCHLOROQUINE SULFATE 200 MG: 200 TABLET, FILM COATED ORAL at 08:07

## 2023-07-06 NOTE — PT/OT/SLP EVAL
Speech Language Pathology Department  Cognitive-Communication Evaluation    Patient Name:  Davonte Ortega   MRN:  [id number]    Recommendations:     General recommendations:  SLP intervention not indicated  Communication strategies:  none    History:     Davonte Ortega is a/n 33 y.o. female admitted with difficulty ambulating secondary to weakness.    Past Medical History:   Diagnosis Date    Systemic lupus erythematosus, organ or system involvement unspecified     Ulcerative colitis      Previous level of Function  Education:some college  Occupation: unemployed  Lives: with spouse  Handed: Right  Glasses: no  Hearing Aids: no    Subjective     Patient awake and alert.  Spiritual/Cultural/Moravian Beliefs/Practices that affect care: no  Pain/Comfort: Pain Rating 1: 0/10    Objective:     ORAL MUSCULATURE  Dentition: own teeth  Facial Movement: WFL  Buccal Strength & Mobility: WFL  Mandibular Strength & Mobility: WFL    SPEECH PRODUCTION  Phoneme Production: adequate  Voice Quality: adequate  Voice Production: adequate  Speech Rate: appropriate  Loudness: acceptable  Respiration: WFL for speech  Speech Intelligibility  Known Context: Greater that 90%  Unknown Context: Greater that 90%    AUDITORY COMPREHENSION  Identification:  Body parts: 100%  Objects: 100%  Following Directions:  1-Step: 100%  2-Step: 100%  Yes/No Questions:  Biographical: 100%  Environmental: 100%  Simple: 100%  Complex: 100%    VERBAL EXPRESSION  Automatic Speech:  Functional needs: 100%  Days of the week: 100%  Months of the year: 100%  Repetition:  Phonemes: 100%  Single syllable words: 100%  Bi-syllabic words: 100%  Phrase Completion: 100%  Confrontation Naming  Body Parts: 100%  Objects: 100%  Wh- Questions:  Object name: 100%  Object function: 100%    COGNITION  Orientation:  Person: yes  Place: yes  Time: yes  Situation: yes   Memory:  Immediate: 100%  Delayed: 100%  Short Term: 100%  Problem Solving  Functional simple: 100%  Functional  complex: 100%  Organization:  Convergent thinkin%  Divergent thinkin%  Sequencin%  Executive Function:  Attention to detail: 100%  Initiation: 100%  Plannin%    Assessment:     Pt presents with functional speech and language skills, cognitive linguistic skills note to be at baseline. No skilled SLP intervention is warranted at this time.     Patient Education:     Patient provided with verbal education regarding POC.  Understanding was verbalized.     Time Tracking:     SLP Treatment Date:    23  Speech Start Time:   900  Speech Stop Time:      915  Speech Total Time (min):   15    Billable minutes:  Evaluation of Speech Sound Production with Comprehension and Expression, 15 minutes     2023

## 2023-07-06 NOTE — PT/OT/SLP PROGRESS
"Physical Therapy Treatment    Patient Name:  Davonte Ortega   MRN:  [id number]    Recommendations:     Discharge Recommendations: home with home health  Discharge Equipment Recommendations: none  Barriers to discharge: Ongoing medical needs    Assessment:     Davonte Ortega is a 33 y.o. female admitted with a medical diagnosis of Weakness of both lower extremities.  She presents with the following impairments/functional limitations: weakness, impaired endurance, pain .    Rehab Prognosis: Good; patient would benefit from acute skilled PT services to address these deficits and reach maximum level of function.    Recent Surgery: * No surgery found *      Plan:     During this hospitalization, patient to be seen 5 x/week to address the identified rehab impairments via gait training, therapeutic activities, therapeutic exercises and progress toward the following goals:    Plan of Care Expires:  07/11/23    Subjective     Chief Complaint: back pain, BLE numbness  Patient/Family Comments/goals:   Pain/Comfort:  Pain Rating 1: 9/10  Location 1: back      Objective:     Communicated with RN prior to session.  Patient found supine with   upon PT entry to room.     General Precautions: Standard, fall  Orthopedic Precautions: N/A  Braces: N/A  Respiratory Status: Room air  Skin Integrity: Visible skin intact      Functional Mobility:  Bed Mobility:     Rolling Left:  minimum assistance  Supine to Sit: minimum assistance  Sit to Supine: minimum assistance  Transfers:     Sit to Stand:  contact guard assistance with rolling walker  Gait: Pt amb 14ft with RW cga. Very slow benjie, Vcs to keep RW in close proximity.    Therapeutic Activities/Exercises:  Pt sat EOB SBA with c/o "pressure" on back while therapist set up equipment.    Education:  Patient provided with verbal education regarding POC.  Understanding was verbalized, however additional teaching warranted.     Patient left supine with all lines intact and call button in " reach..    GOALS:   Multidisciplinary Problems       Physical Therapy Goals          Problem: Physical Therapy    Goal Priority Disciplines Outcome Goal Variances Interventions   Physical Therapy Goal     PT, PT/OT Ongoing, Progressing     Description: Pt will be seen for the following goals:  1. Pt will be ind with all transfers  2. Pt will be ind with gait no AD 200ft                       Time Tracking:     PT Received On: 07/06/23  PT Start Time: 1010     PT Stop Time: 1022  PT Total Time (min): 12 min     Billable Minutes: Gait Training 12    Treatment Type: Treatment  PT/PTA: PTA     Number of PTA visits since last PT visit: 2     07/06/2023

## 2023-07-06 NOTE — PROGRESS NOTES
[facility] Hospital Medicine Progress Note        Chief Complaint: Inpatient Follow-up for     HPI:   Pt with h/o of lupus followed locally by Dr. Cazares , ulcerative colitis, Hypothyroidism and fibromyalgia presented to the ED with c/o 2 days h/o ( started from Friday night) paresthesia and motor weakness involving both upper and lower ext ( proximal to distal) and  difficulty ambulating due to muscle weakness. Symptoms started following episodes of gastroenteritis and diarrhea on Thursday that resolved on Friday. Other associated symptoms include low back pain, sensation of pressure in the lower back but no saddle anesthesia , urinary or bowel disturbance. No h/o trauma or fall. Recent mild lupus flare up with onset of rash improved with prednisone therapy. Symptoms are progressive and worsening since onset. MRI brain, C/T/L spine w/wo contrast in the ED today were neg for demyelinating disease. Labs were all unremarkable. UA neg for infection. UDS + amphetamine, Fentanyl, Opiates and bezo, however pt denied use of such except Norco at home. Pt did receive Ativan, morphine, Zofran, Droperidol in the ED could contribute to abnormal UDS.      Symptoms improved somewhat and pt was able to getup and walk to the bathroom after MRI and shortly after receiving Droperidol. Neurology consult obtained and suggested reassess pt in am         PATIENT CURRENTLY BEING managed for paresthesias of bilateral upper extremity and bilateral lower extremity.  Neurology has assessed patient- does not think the symptoms in keeping with any diagnoses and thinks patient may benefit from psych eval and continued reassurance.  Currently awaiting rheumatology evaluation.  C3-C4 currently normal not in keeping with an SLE flare.     Interval Hx:   Afebrile, hemodynamically stable overnight.  When seen at bedside patient was alert, emotional, tearful during the conversation.  Complains of pelvic numbness, lower  extremity predominantly bilateral foot numbness/tingling S.  Tolerating p.o..   at bedside had multiple questions.  Workup negative thus far.  Patient requesting IV morphine again.  Patient has been requesting repeat neurology evaluation for suspicion for Guillain-[place] syndrome.      Objective/physical exam:  Vitals:    07/06/23 0022 07/06/23 0334 07/06/23 0631 07/06/23 0738   BP: 112/67 137/67  121/78   Patient Position:       Pulse: 76 80  82   Resp: 18 18 15 18   Temp: 97.9 °F (36.6 °C) 97.3 °F (36.3 °C)  97.8 °F (36.6 °C)   TempSrc: Oral Oral  Oral   SpO2: 99% 98%  99%   Weight:       Height:         General: In no acute distress, afebrile  Respiratory: Clear to auscultation bilaterally  Cardiovascular: S1, S2, no appreciable murmur  Abdomen: Soft, nontender, BS +  MSK: Warm, no lower extremity edema, no clubbing or cyanosis  Neurologic: Alert and oriented x4, moving all extremities with good strength     Lab Results   Component Value Date     07/04/2023    K 4.4 07/04/2023    CO2 22 07/04/2023    BUN 14.1 07/04/2023    CREATININE 0.87 07/04/2023    CALCIUM 9.3 07/04/2023    EGFRNONAA >60 05/13/2020      Lab Results   Component Value Date    ALT 9 07/03/2023    AST 17 07/03/2023    ALKPHOS 85 07/03/2023    BILITOT 0.7 07/03/2023      Lab Results   Component Value Date    WBC 7.54 07/03/2023    HGB 15.3 07/03/2023    HCT 45.3 07/03/2023    MCV 88.6 07/03/2023     07/03/2023           Medications:   gabapentin  300 mg Oral TID    hydrOXYchloroQUINE  200 mg Oral BID    levothyroxine  125 mcg Oral Before breakfast    pantoprazole  40 mg Intravenous BID    predniSONE  20 mg Oral Daily    rivaroxaban  10 mg Oral Daily with dinner      acetaminophen, ketorolac, ondansetron, oxyCODONE, polyethylene glycol, zolpidem     Assessment/Plan:    Paresthesia and weakness of extremities of unclear etiology   Recent h/o Viral Gastroenteritis   H/O Lupus, UC, Hypothyroidism and Fibromyalgia     Plan:  -workup  negative thus far including MRI spine.  Patient's family concerned about Guillain-[place] syndrome in setting of recent viral gastroenteritis.  Will repeat neurology evaluation for further input  -id on board for further evaluation.  Appreciate assistance  - pt refused psych eval  - Rheumatology following as well  -continue therapy as tolerated.    -stool workup negative thus far  -other home medications were reviewed and renewed   - refrain from IV narcotics    Angel Singh MD

## 2023-07-06 NOTE — PLAN OF CARE
Problem: Adult Inpatient Plan of Care  Goal: Plan of Care Review  Outcome: Met  Goal: Patient-Specific Goal (Individualized)  Outcome: Met  Goal: Absence of Hospital-Acquired Illness or Injury  Outcome: Met  Goal: Optimal Comfort and Wellbeing  Outcome: Met  Goal: Readiness for Transition of Care  Outcome: Ongoing, Progressing

## 2023-07-06 NOTE — CONSULTS
Infectious Diseases Consultation       Inpatient consult to Infectious Diseases  Consult performed by: Elvia Fair MD  Consult ordered by: AMEE Avelar      HPI:   31-year-old female with past medical history of lupus, ulcerative colitis, hypothyroidism, fibromyalgia, is admitted to [facility] on 07/03/2023, presenting through the ED with complaints of 2 days of paresthesia and weakness to both upper lower extremities with difficulty ambulating and reports symptoms following episode of acute gastrointestinal problems including abdominal pain and diarrhea.  She was extensively evaluated on presentation and noted to have no fevers and no leukocytosis.  Urine toxicology test positive for benzodiazepines, opiates, amphetamines and fentanyl.  Urinalysis is not impressive.  Stool GI panel and culture negative.  She had extensive imaging studies.  CT head, cervical, thoracic, and lumbar spine with no acute abnormality.  MRI of the brain showed right maxillary sinusitis otherwise unremarkable.  MRI of the cervical, thoracic and lumbar spine with no cord signal abnormality or evidence of active demyelination.  He was seen by neurologist with inputs noted as well as seen by Rheumatology.  She is not on any antimicrobials.    Past Medical and Surgical History  Allergies :   Cefaclor, Fluconazole, Levofloxacin, Sulfa (sulfonamide antibiotics), and Trimethoprim    Medical :   She has a past medical history of Systemic lupus erythematosus, organ or system involvement unspecified and Ulcerative colitis.    Surgical :   She has no past surgical history on file.     Family History  Reviewed and noncontributory    Social History  She  denies any alcohol, tobacco or illicit drug abuse,  and  is a     Review of Systems   Constitutional:  Positive for malaise/fatigue.   HENT: Negative.     Respiratory: Negative.     Gastrointestinal: Negative.    Genitourinary:  "Negative.    Musculoskeletal:  Positive for back pain.   Neurological:  Positive for focal weakness and weakness.   Endo/Heme/Allergies: Negative.    Psychiatric/Behavioral: Negative.     All other Systems review done and negative.    Objective   Physical Exam  Vitals reviewed.   Constitutional:       General: She is not in acute distress.     Appearance: She is not toxic-appearing.      Comments: Lying in bed.   at the bedside   HENT:      Head: Normocephalic and atraumatic.   Eyes:      Pupils: Pupils are equal, round, and reactive to light.   Cardiovascular:      Rate and Rhythm: Normal rate and regular rhythm.      Heart sounds: Normal heart sounds.   Pulmonary:      Effort: Pulmonary effort is normal.      Breath sounds: Normal breath sounds.   Abdominal:      General: Bowel sounds are normal. There is no distension.      Palpations: Abdomen is soft.      Tenderness: There is no abdominal tenderness.   Genitourinary:     Comments: No suprapubic tenderness  Musculoskeletal:      Cervical back: Neck supple.      Right lower leg: No edema.      Left lower leg: No edema.   Skin:     Findings: No erythema or rash.   Neurological:      Mental Status: She is alert and oriented to person, place, and time.      Motor: Weakness present.   Psychiatric:      Comments: Calm and cooperative     VITAL SIGNS: 24 HR MIN & MAX LAST    Temp  Min: 97.4 °F (36.3 °C)  Max: 98.3 °F (36.8 °C)  97.6 °F (36.4 °C)        BP  Min: 112/71  Max: 124/83  124/83     Pulse  Min: 73  Max: 86  73     Resp  Min: 16  Max: 18  18    SpO2  Min: 94 %  Max: 100 %  (!) 94 %      HT: 4' 11" (149.9 cm)  WT: 53.5 kg (118 lb)  BMI: 23.8     Recent Results (from the past 24 hour(s))   GI Panel    Collection Time: 07/05/23  8:39 AM   Result Value Ref Range    CAMPYLOBACTER Not Detected Not Detected    PLESIOMONAS SHIGELLOIDES Not Detected Not Detected    SALMONELLA Not Detected Not Detected    Vibrio Not Detected Not Detected    YERSINIA " ENTEROCOLITICA Not Detected Not Detected    ENTEROAGGREGATIVE E. COLI (EAEC) Not Detected Not Detected    ENTEROPATHOGENIC E. COLI (EPEC) Not Detected Not Detected    Enterotoxigenic E. coli (ETEC) Not Detected Not Detected    SHIGA-LIKE TOXIN-PRODUCING E. COLI (STEC) Not Detected Not Detected    Shigella/Enteroinvasive E. coli (EIEC) Not Detected Not Detected    CRYPTOSPORIDIUM Not Detected Not Detected    Cycolospora cayetanensis Not Detected Not Detected    Entamoeba histolytica Not Detected Not Detected    Giardia lamblia Not Detected Not Detected    Adenovirus F 40/41 Not Detected Not Detected    Astrovirus Not Detected Not Detected    Norovirus GI/GII Not Detected Not Detected    Rotavirus A Not Detected Not Detected    Sapovirus Not Detected Not Detected    VIBRIO CHOLERAE Not Detected Not Detected   CK    Collection Time: 07/05/23  1:36 PM   Result Value Ref Range    Creatine Kinase 60 29 - 168 U/L       Imaging  Imaging Results              MRI Lumbar Spine W WO Cont (Final result)  Result time 07/03/23 14:46:18      Final result by Gloria Cerda MD (07/03/23 14:46:18)                   Impression:      Normal MRI of the lumbar spine with no evidence of demyelinating process seen      Electronically signed by: Gloria Cerda  Date:    07/03/2023  Time:    14:46               Narrative:    EXAMINATION:  MRI LUMBAR SPINE W WO CONTRAST    CLINICAL HISTORY:  Demyelinating disease;    TECHNIQUE:  The patient's lumbar spine was examined sagittal and axial planes in T1, T2, stir sequences.  Postcontrast T1 imaging was performed as well.    COMPARISON:  CT scan from 07/03/2023    FINDINGS:  The vertebral body heights and alignment are well maintained.  No fracture seen.  No dislocation is seen.  Cord ends at T12-L1.  Visualized portion of the conus appears grossly unremarkable.  No cord lesion is seen.  No lesion is seen in the cauda equina.  No areas of abnormal enhancement are seen.    No disc bulge  or herniation is seen.  No spinal or foraminal stenosis seen.  No significant facet arthropathy is seen.    Paraspinal soft tissues appear grossly unremarkable.                                       MRI Thoracic Spine W WO Cont (Final result)  Result time 07/03/23 14:45:08      Final result by Silvia Serrato MD (07/03/23 14:45:08)                   Impression:      No cord signal abnormality or evidence of active demyelination.      Electronically signed by: Silvia Serrato  Date:    07/03/2023  Time:    14:45               Narrative:    EXAMINATION:  MRI THORACIC SPINE W WO CONTRAST    CLINICAL HISTORY:  Demyelinating disease;    TECHNIQUE:  Multiplanar multisequence MR images of the thoracic spine are obtained with and without contrast.    COMPARISON:  None    FINDINGS:  Thoracic alignment is normal.  The vertebral body heights are maintained.  The bone marrow is normal in signal.    There is no cord signal abnormality.  There is no abnormal cord enhancement.  There is no epidural fluid collection.    There is no disc herniation.  The spinal canal and neural foramina are patent.    The paraspinal soft tissues are unremarkable.                                       MRI Cervical Spine W WO Cont (Final result)  Result time 07/03/23 14:39:41   Procedure changed from MRI Spine Cervical-Thoracic-Lumbar W W/O Contrast (XPD)     Final result by Silvia Serrato MD (07/03/23 14:39:41)                   Impression:      No cord signal abnormality or evidence of active demyelination.      Electronically signed by: Silvia Serrato  Date:    07/03/2023  Time:    14:39               Narrative:    EXAMINATION:  MRI CERVICAL SPINE W WO CONTRAST    CLINICAL HISTORY:  Demyelinating disease;Myelopathy, acute;    TECHNIQUE:  Multiplanar, multisequence MR imaging of the cervical spine with and without contrast.    COMPARISON:  None    FINDINGS:  Cervical alignment is normal.  The vertebral body heights are maintained.  The  bone marrow is normal signal.    There is no cord signal abnormality.  There is no abnormal cord enhancement.  There is no drainable fluid collection.    A tiny right central disc protrusion at C4-C5 measures less than 2 mm in thickness.  There is no significant spinal canal or neural foraminal stenosis.    The paraspinal soft tissues are unremarkable.  There is no drainable fluid collection.                                       MRI Brain W WO Contrast (Final result)  Result time 07/03/23 14:41:31      Final result by Gloria Cerda MD (07/03/23 14:41:31)                   Impression:      Right maxillary sinusitis otherwise unremarkable      Electronically signed by: Gloria Cerda  Date:    07/03/2023  Time:    14:41               Narrative:    EXAMINATION:  MRI BRAIN W WO CONTRAST    CLINICAL HISTORY:  numbness, autoimmune disease;    TECHNIQUE:  Multiplanar multisequence MR imaging of the brain was performed without and with contrast.    COMPARISON:  None available    FINDINGS:  No intracranial mass or lesion is seen.  No hemorrhage is seen.  No acute infarct is seen.  No diffusion abnormality seen.  No parenchymal abnormality is seen.  Gyri and sulci appear normal.  Ventricles and basilar cisterns appear grossly unremarkable.  No abnormal white matter changes are seen..  Posterior fossa appears normal.  Postcontrast imaging demonstrates no evidence of abnormal enhancement.  No mass is seen.  No vascular enhancement seen.  No dural enhancement is seen.  Calvarium is intact.  There is evidence of right maxillary sinusitis                                       CT Head Without Contrast (Final result)  Result time 07/03/23 09:43:01      Final result by Silvia Serrato MD (07/03/23 09:43:01)                   Impression:      No acute intracranial abnormality.      Electronically signed by: Silvia Serrato  Date:    07/03/2023  Time:    09:43               Narrative:    EXAMINATION:  CT HEAD WITHOUT  CONTRAST    CLINICAL HISTORY:  weakness/numbness;    TECHNIQUE:  Axial scans were obtained from skull base to the vertex.    Coronal and sagittal reconstructions obtained from the axial data.    Automatic exposure control was utilized to limit radiation dose.    Contrast: None    Radiation Dose:    Total DLP: 923 mGy*cm    COMPARISON:  None    FINDINGS:  There is no acute intracranial hemorrhage or edema. The gray-white matter differentiation is preserved.    There is no mass effect or midline shift. The ventricles and sulci are normal in size. The basal cisterns are patent. There is no abnormal extra-axial fluid collection.    The calvarium and skull base are intact.  There is a right maxillary sinus mucosal retention cyst.                                       CT Lumbar Spine Without Contrast (Final result)  Result time 07/03/23 09:45:12      Final result by Silvia Serrato MD (07/03/23 09:45:12)                   Impression:      No acute abnormality identified.      Electronically signed by: Silvia Serrato  Date:    07/03/2023  Time:    09:45               Narrative:    EXAMINATION:  CT LUMBAR SPINE WITHOUT CONTRAST    CLINICAL HISTORY:  Low back pain, cauda equina syndrome suspected;    TECHNIQUE:  Noncontrast CT images of the lumbar spine. Axial, coronal, and sagittal reformatted images were obtained. Dose length product is 450 mGycm. Automatic exposure control, adjustment of mA/kV or iterative reconstruction technique was used to limit radiation dose.    COMPARISON:  None    FINDINGS:  There are 5 non-rib-bearing lumbar type vertebral bodies.  Alignment is normal.  The vertebral heights are maintained.  There is no acute fracture identified.  There is no evidence of a disc herniation.  The spinal canal and neural foramina are patent.  The paraspinal soft tissues are unremarkable.                                       Impression  1. Bilateral upper and lower extremity weakness/paresthesia  2.  Recent  gastroenteritis  3.  History of lupus  4.  History of fibromyalgia  5.  chronic back pain    Recommendations  I agree with the management of this patient, however I am concerned about the development of urological symptoms  with weakness of  extremities post a gastrointestinal illness which can be seen with GI pathogens preceding development of Guillain-[place] syndrome.  She has had extensive imaging studies with no suggestion of demyelinating disease.  I do think we may need to consider lumbar puncture for CSF analysis including looking for potential albumino-cytologic dissociation of demyelinating disease.  She has however been seen by Neurology with inputs noted.  Patient and  have requested a 2nd neurology opinion and consultation has been put up.  We will defer the decision of whether or not to proceed with a lumbar puncture until seen by the awaited neurologist.  Patient and  seem to be a lateral reluctant with getting a lumbar puncture starting concern a complicated procedure based on past discussions with her healthcare providers, Site and history of CSF leak and back problems.  I have spent a great deal of time discussing this case with patient and , with the  having a lot to say, and the nurse present throughout the encounter.  I have discussed the case with nursing staff as well.  I would like to thank the team very much for the opportunity to assist in the care of this patient.

## 2023-07-06 NOTE — DISCHARGE SUMMARY
[facility] - 4th Floor [facility] Medicine  Discharge Summary      Patient Name: Davonte Ortega  MRN: [id number]  [facility]: [id number]  Patient Class: IP- Inpatient  Admission Date: 7/3/2023  Hospital Length of Stay: 3 days  Discharge Date and Time:  07/07/2023 5:45 PM  Attending Physician: No att. providers found   Discharging Provider: Angel Singh MD  Primary Care Provider: Charlie Smith MD    Primary Care Team: Networked reference to record PCT     Pt with h/o of lupus followed locally by Dr. Cazares , ulcerative colitis, Hypothyroidism and fibromyalgia presented to the ED with c/o 2 days h/o ( started from Friday night) paresthesia and motor weakness involving both upper and lower ext ( proximal to distal) and  difficulty ambulating due to muscle weakness. Symptoms started following episodes of gastroenteritis and diarrhea on Thursday that resolved on Friday. Other associated symptoms include low back pain, sensation of pressure in the lower back but no saddle anesthesia , urinary or bowel disturbance. No h/o trauma or fall. Recent mild lupus flare up with onset of rash improved with prednisone therapy. Symptoms are progressive and worsening since onset. MRI brain, C/T/L spine w/wo contrast in the ED today were neg for demyelinating disease. Labs were all unremarkable. UA neg for infection. UDS + amphetamine, Fentanyl, Opiates and bezo, however pt denied use of such except Norco at home. Pt did receive Ativan, morphine, Zofran, Droperidol in the ED could contribute to abnormal UDS.      Symptoms improved somewhat and pt was able to getup and walk to the bathroom after MRI and shortly after receiving Droperidol.  Neurology suggested psych assessment.  Patient refused psychiatric evaluation.  Neurology was reconsulted as per patient request to be evaluated for Guillain-[place] syndrome.  Id and rheumatology was consulted as well...  Neurology suggested outpatient follow-up.   Initially the plan was to get will be.  Patient refused IV IG and LP and Wanted to be discharged.  Neurology cleared for discharge.  She will be discharged to home today.  All medications were reconciled.    Paresthesia and weakness of extremities of unclear etiology   Recent h/o Viral Gastroenteritis   H/O Lupus, UC, Hypothyroidism and Fibromyalgia       Goals of Care Treatment Preferences:             What is most important right now is to focus on curative/life-prolongation (regardless of treatment burdens).  Accordingly, we have decided that the best plan to meet the patient's goals includes continuing with treatment.      Consults:   Consults (From admission, onward)          Status Ordering Provider     Inpatient consult to Neurology  Once        Provider:  Jacob Mendez MD    Completed GERARD YEE     Inpatient consult to Palliative Care  Once        Provider:  Иван Serrato MD    Completed RICHIE HAWKINS A     Inpatient consult to Infectious Diseases  Once        Provider:  Elvia Fair MD    Completed AMUSA IRADAT A     Inpatient consult to Social Work/Case Management  Once        Provider:  (Not yet assigned)    Completed AMUSAAYAKAADAT A     Inpatient consult to Neurology  Once        Provider:  Bernabe Lim MD    Completed VANE KEARNEY     Inpatient consult to Neurology  Once        Provider:  Bernabe Lim MD    Completed MARK MALONEY            No new Assessment & Plan notes have been filed under this hospital service since the last note was generated.  Service: Hospital Medicine    Final Active Diagnoses:    Diagnosis Date Noted POA    PRINCIPAL PROBLEM:  Weakness of both lower extremities [R29.898] 07/03/2023 Yes    Paresthesias with subjective weakness [R20.2, R53.1] 07/04/2023 Yes      Problems Resolved During this Admission:       Discharged Condition: good    Disposition: Home or Self Care    Follow Up:   Follow-up Information       Charlie Smith MD  Follow up in 1 week(s).    Specialty: Family Medicine  Contact information:  [place] [address] [phone]                           Patient Instructions:   No discharge procedures on file.    Significant Diagnostic Studies: Labs: [facility] No results for input(s): NA, K, CL, CO2, GLU, BUN, CREATININE, CALCIUM, PROT, ALBUMIN, BILITOT, ALKPHOS, AST, ALT, ANIONGAP, ESTGFRAFRICA, EGFRNONAA in the last 48 hours.    Pending Diagnostic Studies:       Procedure Component Value Units Date/Time    NMO AQUAPORIN-4-IGG, SERUM [[id number]] Collected: 07/06/23 1445    Order Status: Sent Lab Status: In process Updated: 07/06/23 1454    Specimen: Blood            Medications:  Reconciled Home Medications:      Medication List        CONTINUE taking these medications      gabapentin 100 MG capsule  Commonly known as: NEURONTIN  Take 100 mg by mouth 3 (three) times daily.     hydrOXYchloroQUINE 200 mg tablet  Commonly known as: PLAQUENIL  Take 200 mg by mouth 2 (two) times daily.     levothyroxine 125 MCG tablet  Commonly known as: SYNTHROID  Take 125 mcg by mouth before breakfast.     predniSONE 10 MG tablet  Commonly known as: DELTASONE  Take 10 mg by mouth once daily.              Indwelling Lines/Drains at time of discharge:   Lines/Drains/Airways       None                   Time spent on the discharge of patient: 35 minutes         Angel Singh MD  Department of Hospital Medicine  [facility] - 4th Floor Medical Telemetry

## 2023-07-06 NOTE — PROGRESS NOTES
[facility] - 4th Floor Medical Telemetry  Rheumatology  Progress Note    Patient Name: Davonte Ortega  MRN: [id number]  Admission Date: 7/3/2023  Hospital Length of Stay: 3 days  Code Status: No Order   Attending Provider: Celina Lopes MD  Primary Care Physician: Charlie Smith MD  Principal Problem: Weakness of both lower extremities    Subjective:     HPI: Ms. Ortega is a 34 y/o female that presented to the ED with chief complaints of: 2 days h/o ( started from Friday night) paresthesia and motor weakness involving both upper and lower ext ( proximal to distal) and  difficulty ambulating due to muscle weakness. She has hx of Lupus, diagnosed when she was 20 years old. She also has hx of fibromyalgiaShe is established with local Rheumatologist, Dr. Cazares. She is currently on hydroxychloroquine and reports her Lupus has been in remission for years. She does reports a lupus flare rash that she had rash on bilateral extremities that resolved in days. She does report recent gastroenteritis/diarrhea stomach bug that resolved before presenting to ED. Other associated symptoms include low back pain, sensation of pressure in the lower back but no saddle anesthesia  MRI brain, C/T/L spine w/wo contrast were completed and negative for demyelinating disease. MRI of C,T, and L spine were essentially normal. Neurology was consulted and as of now signed off.  7/5 neurology was re consulted per patient request. Labs: C3 and C4 normal    Consult: lupus; BUE & BLE weakness and numbness      Interval History: Patient walking to bathroom when I entered the room. Patient is walking slow, short  gait but is able to walk to bathroom independently. Reports leg starts to shake when walking. Reports she was unable to walk with physical therapy today. Patient continues with lower extremity numbness/weakness. Discussed Rheumatological lab results: EZRA panel negative, ANCA Ab neg, CK normal    Current Facility-Administered  Medications   Medication Frequency    acetaminophen tablet 650 mg Q6H PRN    gabapentin capsule 300 mg TID    hydrOXYchloroQUINE tablet 200 mg BID    ketorolac injection 30 mg Q6H PRN    levothyroxine tablet 125 mcg Before breakfast    ondansetron injection 4 mg Q4H PRN    oxyCODONE immediate release tablet 10 mg Q6H PRN    pantoprazole injection 40 mg BID    polyethylene glycol packet 17 g BID PRN    predniSONE tablet 20 mg Daily    rivaroxaban tablet 10 mg Daily with dinner    zolpidem tablet 10 mg Nightly PRN     Family History    None       Tobacco Use    Smoking status: Not on file    Smokeless tobacco: Not on file   Substance and Sexual Activity    Alcohol use: Not on file    Drug use: Not on file    Sexual activity: Not on file       Objective:     Vital Signs (Most Recent):  Temp: 97.8 °F (36.6 °C) (07/06/23 1041)  Pulse: 74 (07/06/23 1041)  Resp: 18 (07/06/23 1229)  BP: 106/69 (07/06/23 1041)  SpO2: 97 % (07/06/23 1041) Vital Signs (24h Range):  Temp:  [97.3 °F (36.3 °C)-97.9 °F (36.6 °C)] 97.8 °F (36.6 °C)  Pulse:  [73-86] 74  Resp:  [15-18] 18  SpO2:  [94 %-99 %] 97 %  BP: (106-137)/(67-83) 106/69     Weight: 53.5 kg (118 lb) (07/03/23 0108)  Body mass index is 23.83 kg/m².  Body surface area is 1.49 meters squared.      Intake/Output Summary (Last 24 hours) at 7/6/2023 1314  Last data filed at 7/5/2023 2043  Gross per 24 hour   Intake 200 ml   Output 700 ml   Net -500 ml           Physical Exam   Constitutional: She is oriented to person, place, and time.   HENT:   Head: Normocephalic.   Nose: Nose normal.   Cardiovascular: Normal rate.   Pulmonary/Chest: Effort normal.   Musculoskeletal:         General: No swelling.      Right elbow: Normal.      Left elbow: Normal.      Right wrist: Normal.      Left wrist: Normal.      Right knee: Normal.      Left knee: Normal.   Neurological: She is alert and oriented to person, place, and time. She displays weakness.   Lower extremity weakness R>L     Skin: No rash noted.   Psychiatric: Mood normal.       Right Side Rheumatological Exam     Examination finds the elbow, wrist, knee, 1st PIP, 1st MCP, 2nd PIP, 2nd MCP, 3rd PIP, 3rd MCP, 4th PIP, 4th MCP, 5th PIP and 5th MCP normal.    Left Side Rheumatological Exam     Examination finds the elbow, wrist, knee, 1st PIP, 1st MCP, 2nd PIP, 2nd MCP, 3rd PIP, 3rd MCP, 4th PIP, 4th MCP, 5th PIP and 5th MCP normal.         Significant Labs:  All pertinent lab results from the last 24 hours have been reviewed.    Significant Imaging:  Imaging results within the past 24 hours have been reviewed.    Assessment/Plan:     Orthopedic  * Weakness of both lower extremities  Bilateral weakness. Imaging reviewed. MRI negative for vasculitis or demyelinating disease. C3 C4 normal, CRP normal, urinalysis negative for protein. Hx of Lupus (diagnosed when 21 y/o) followed by Dr. Cazares. Currently on HCQ and prednisone.  Did want to add Cymbalta to help with fibromyalgia but patient reports she has been on this in the past and makes her sick. Did add PRN med for sleep.     Rheumatological labs completed: EZRA panel negative, ANCA ab negative, CK normal. Very low suspicion of autoimmune process causing weakness at this point. Noted neurology was consulted for 2nd opinion to look into GB.            Kajal Major, AMEE  Rheumatology  [facility] - 4th Floor Medical Telemetry

## 2023-07-06 NOTE — PLAN OF CARE
Notified by Dr Mendez that patient will need transfer for inpatient EMG. PFC orders placed and spoke to Danielle at the transfer center.

## 2023-07-06 NOTE — CONSULTS
Reconsult regarding BLE weakness    Hpi  Had GI illness late last week  Sunday morning began having severe tingling in hands/feet and became paralyzed from the neck down; lasted a few hours; resolved in ER with antipsychotic; has intermittently returned throughout the hospitalizion  Mri brain/c/t/l spine all negative for lesions  Has stable lupus  Her main c/o is RLE weakness and severe back/body pain  She has fibromyalgia as well  Pan positive UDS  Requesting transfer to [facility]    The remainder of the 14 system ROS is noncontributory or negative unless mentioned/reviewed above.  Past Medical History:   Diagnosis Date    Systemic lupus erythematosus, organ or system involvement unspecified     Ulcerative colitis      History reviewed. No pertinent surgical history.  Social History     Socioeconomic History    Marital status:      Social History     Substance and Sexual Activity   Sexual Activity Not on file     Scheduled Meds:   gabapentin  300 mg Oral TID    hydrOXYchloroQUINE  200 mg Oral BID    levothyroxine  125 mcg Oral Before breakfast    pantoprazole  40 mg Intravenous BID    predniSONE  20 mg Oral Daily    rivaroxaban  10 mg Oral Daily with dinner     Continuous Infusions:  PRN Meds:.acetaminophen, ketorolac, ondansetron, oxyCODONE, polyethylene glycol, zolpidem  Vitals:    07/06/23 1229   BP:    Pulse:    Resp: 18   Temp:        Mental Status: Alert and oriented x3. Language is fluent with good comprehension.    Cranial Nerve: Pupils are equal, round, and reactive to light. Visual fields are intact to confrontation. Normal fundi. Ocular movements are intact. Face is symmetric at rest and with activation with intact sensation throughout. Hearing intact to finger rub bilaterally. Muscles of tongue and palate activate symmetrically. No dysarthria. Strength is full in sternocleidomastoid and trapezius bilaterally.    Motor: 4/5 RLE    Sensory: Sensation is intact to light touch, pinprick,  vibration, and proprioception throughout. Romberg is negative.    Reflexes: 1+ and symmetric at the biceps, triceps, brachioradialis, patella, and Achilles bilaterally. Plantar response is flexor bilaterally.    Coordination: No dysmetria on finger-nose-finger or heel-knee-shin. Normal rapid alternating movements. Fast finger tapping with normal amplitude and speed.    Gait: Narrow based with normal stride length and good arm swing bilaterally. Able to walk on heels, toes, and in tandem.        Imp  Functional weakness  LP to exclude GBS  Consider IVIG if LP suggestive  Pain control by palliative care  [facility] also does not perform inpatient EMG studies and the patient will not be transferred; spoke with their transfer center and on call neurologist myself

## 2023-07-06 NOTE — ASSESSMENT & PLAN NOTE
Bilateral weakness. Imaging reviewed. MRI negative for vasculitis or demyelinating disease. C3 C4 normal, CRP normal, urinalysis negative for protein. Hx of Lupus (diagnosed when 19 y/o) followed by Dr. Cazares. Currently on HCQ and prednisone.  Did want to add Cymbalta to help with fibromyalgia but patient reports she has been on this in the past and makes her sick. Did add PRN med for sleep.     Rheumatological labs completed: EZRA panel negative, ANCA ab negative, CK normal. Very low suspicion of autoimmune process causing weakness at this point. Noted neurology was consulted for 2nd opinion to look into GB.

## 2023-07-06 NOTE — CONSULTS
Inpatient consult to Palliative Care  Consult performed by: AMEE Heaton  Consult ordered by: Yves Reyes MD      Patient Name: Davonte Ortega   MRN: [id number]   Admission Date: 7/3/2023   Hospital Length of Stay: 3   Attending Provider: Celina Lopes MD   Consulting Provider: Estela LBUIN  Reason for Consult: Goals of Care  Primary Care Physician: Charlie Smith MD     Principal Problem: Weakness of both lower extremities     Patient information was obtained from patient, relative(s), and ER records.      Final diagnoses:  [R20.2] Paresthesias  [M54.42, M54.41] Acute midline low back pain with bilateral sciatica  [R29.5] Transient paralysis (Primary)     Assessment/Plan:     I reviewed the patient and family's understanding of the seriousness of the illness and its expected prognosis. We discussed the patient's goals of care and treatment preferences. We discussed the difference between palliative and curative medicine. I explained the differences between home health, palliative and hospice care. I clarified current code status. I identified the surrogate decision maker or health care POA. I answered all questions and we formulated a plan including recommendations for symptom management and how to best achieve goals of care.       Advance Care Planning     Date: 07/06/2023    [facility]  I engaged the patient in a voluntary conversation about advance care planning and we specifically addressed what the goals of care would be moving forward, in light of the patient's change in clinical status, specifically current condition.  We did specifically address the patient's likely prognosis, which is fair .  We explored the patient's values and preferences for future care.  The patient endorses that what is most important right now is to focus on curative/life-prolongation (regardless of treatment burdens)    Accordingly, we have decided that the best plan to meet the patient's goals includes continuing  with treatment    Discussed case with hospitalist, DARIEL and staff RN.  Met [facility] patient--introduced service and discussed patient's history and current condition.  Patient related the events of her health history and reports that she and her   moved to [place] from [place] , and she has been trying to establish care in [place].  States that her PCP is located at [facility] and is followed by Dr. Cazares.  States that she had seen pain management practice in [place] but has not established care in [place].      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  Patient informed that she has spoken to rheumatology and neurology and is concerned about her condition and her pain level.  Informed that she had been followed by [facility] and informed she had absorption problem making it difficult for her to metabolize medications.      Discussed pain management at length and discussed with patient that medical team is attempting to treat source of discomfort and that pain medication is supportive to treating causes of discomfort. Explained to patient that IV medication is not ideal and that only oral medications can be given outside the hospital unless the patient has a terminal condition.  Patient then informed that she did not tolerate cymbalta or any other antidepressant given for pain, but has been tolerating gabapentin. Patient expressed frustration and reports that she would like to be transferred to [place].  Discussed with her that she would have to have medical necessity to be transferred.  Informed patient that I would speak with hospitalist to formulate plan.   Offered support and informed I would continue to follow.              Recommendations:     Pain:  discussed with hospitalist--continue percocet      History of Present Illness:     Patient is a 33-year-old female with PMH lupus followed locally by   [facility], ulcerative colitis, hypothyroidism, and fibromyalgia who presented to the ED with complaint of 2 days paresthesia and motor weakness involving both upper and lower extremities and difficulty ambulating due to muscle weakness.  She reported that symptoms started following episodes of gastroenteritis and diarrhea on Thursday the result on Friday.  She had recent mild lupus flare up with onset of rash improved by prednisone therapy.  MRI brain C/T/L spine negative for demyelinating disease.  Labs were negative for acute process. Neurology evaluation did not find symptoms consistent with diagnoses and suggested psych evaluation.  Rheumatology consulted and following.   Dr. Mendez evaluated today with recs of LP to exclude GBS with possibility of IVIG treatment.  Palliative care consulted for support and symptom management.       Active Ambulatory Problems     Diagnosis Date Noted    No Active Ambulatory Problems     Resolved Ambulatory Problems     Diagnosis Date Noted    No Resolved Ambulatory Problems     Past Medical History:   Diagnosis Date    Systemic lupus erythematosus, organ or system involvement unspecified     Ulcerative colitis         History reviewed. No pertinent surgical history.     Review of patient's allergies indicates:   Allergen Reactions    Cefaclor Hives and Swelling    Fluconazole Swelling    Levofloxacin Swelling    Sulfa (sulfonamide antibiotics) Swelling    Trimethoprim Swelling          Current Facility-Administered Medications:     acetaminophen tablet 650 mg, 650 mg, Oral, Q6H PRN, Celina Lopes MD    gabapentin capsule 300 mg, 300 mg, Oral, TID, Celina Lopes MD, 300 mg at 07/06/23 1506    hydrOXYchloroQUINE tablet 200 mg, 200 mg, Oral, BID, Celina Lopes MD, 200 mg at 07/06/23 0855    ketorolac injection 30 mg, 30 mg, Intravenous, Q6H PRN, Yves Reyes MD, 30 mg at 07/05/23 1821    levothyroxine tablet 125 mcg, 125 mcg, Oral, Before breakfast, Celina Lopes MD, 125  "mcg at 07/06/23 0632    ondansetron injection 4 mg, 4 mg, Intravenous, Q4H PRN, Yves Reyes MD    oxyCODONE immediate release tablet 10 mg, 10 mg, Oral, Q6H PRN, Nikki Ugarte [facility], 10 mg at 07/06/23 1229    pantoprazole injection 40 mg, 40 mg, Intravenous, BID, Yves Reyes MD, 40 mg at 07/06/23 0855    polyethylene glycol packet 17 g, 17 g, Oral, BID PRN, Nikki Ugarte [facility], 17 g at 07/05/23 2014    predniSONE tablet 20 mg, 20 mg, Oral, Daily, Yves Reyes MD, 20 mg at 07/06/23 0855    rivaroxaban tablet 10 mg, 10 mg, Oral, Daily with dinner, Yves Reyes MD, 10 mg at 07/05/23 1821    zolpidem tablet 10 mg, 10 mg, Oral, Nightly PRN, Kajal Major, FNP, 10 mg at 07/06/23 0050     acetaminophen, ketorolac, ondansetron, oxyCODONE, polyethylene glycol, zolpidem     History reviewed. No pertinent family history.     Review of Systems   Constitutional:  Positive for activity change, appetite change and fatigue.   Musculoskeletal:  Positive for arthralgias and back pain.          Objective:   /69   Pulse 74   Temp 97.8 °F (36.6 °C) (Oral)   Resp 18   Ht 4' 11" (1.499 m)   Wt 53.5 kg (118 lb)   LMP 06/19/2023 (Approximate)   SpO2 97%   Breastfeeding No   BMI 23.83 kg/m²      Physical Exam  Constitutional:       Appearance: She is ill-appearing.   Eyes:      Pupils: Pupils are equal, round, and reactive to light.   Cardiovascular:      Rate and Rhythm: Normal rate.      Pulses: Normal pulses.   Pulmonary:      Effort: Pulmonary effort is normal.   Skin:     Coloration: Skin is pale.         FAMILY CONTACTS:     Review of Symptoms  Review of Symptoms      Symptom Assessment (ESAS 0-10 Scale)  Pain:  0  Dyspnea:  0  Anxiety:  0  Nausea:  0  Depression:  0  Anorexia:  0  Fatigue:  0  Insomnia:  0  Restlessness:  0  Agitation:  0         Psychosocial/Cultural:   See Palliative Psychosocial Note: Yes  Patient is  and states that she has been disabled since 21.  Reports " that she is a .    **Primary  to Follow**  Palliative Care  Consult: No    Spiritual:  F - Mary and Belief:  Mormonism    Advance Care Planning   Advance Directives:     Decision Making:  Patient answered questions  Goals of Care: The patient endorses that what is most important right now is to focus on curative/life-prolongation (regardless of treatment burdens)    Accordingly, we have decided that the best plan to meet the patient's goals includes continuing with treatment        PAINAD: NA    Caregiver burden formerly assessed: Yes        > 50% of 60 min of encounter was spent in chart review, face to face discussion of goals of care, symptom assessment, coordination of care and emotional support.         Estela Desai FNP, [facility]  Palliative Medicine  [facility]

## 2023-07-06 NOTE — SUBJECTIVE & OBJECTIVE
Interval History: Patient walking to bathroom when I entered the room. Patient is walking slow, short  gait but is able to walk to bathroom independently. Reports leg starts to shake when walking. Reports she was unable to walk with physical therapy today. Patient continues with lower extremity numbness/weakness. Discussed Rheumatological labs results: EZRA panel negative, ANCA Ab neg, CK normal    Current Facility-Administered Medications   Medication Frequency    acetaminophen tablet 650 mg Q6H PRN    gabapentin capsule 300 mg TID    hydrOXYchloroQUINE tablet 200 mg BID    ketorolac injection 30 mg Q6H PRN    levothyroxine tablet 125 mcg Before breakfast    ondansetron injection 4 mg Q4H PRN    oxyCODONE immediate release tablet 10 mg Q6H PRN    pantoprazole injection 40 mg BID    polyethylene glycol packet 17 g BID PRN    predniSONE tablet 20 mg Daily    rivaroxaban tablet 10 mg Daily with dinner    zolpidem tablet 10 mg Nightly PRN     Family History    None       Tobacco Use    Smoking status: Not on file    Smokeless tobacco: Not on file   Substance and Sexual Activity    Alcohol use: Not on file    Drug use: Not on file    Sexual activity: Not on file       Objective:     Vital Signs (Most Recent):  Temp: 97.8 °F (36.6 °C) (07/06/23 1041)  Pulse: 74 (07/06/23 1041)  Resp: 18 (07/06/23 1229)  BP: 106/69 (07/06/23 1041)  SpO2: 97 % (07/06/23 1041) Vital Signs (24h Range):  Temp:  [97.3 °F (36.3 °C)-97.9 °F (36.6 °C)] 97.8 °F (36.6 °C)  Pulse:  [73-86] 74  Resp:  [15-18] 18  SpO2:  [94 %-99 %] 97 %  BP: (106-137)/(67-83) 106/69     Weight: 53.5 kg (118 lb) (07/03/23 0108)  Body mass index is 23.83 kg/m².  Body surface area is 1.49 meters squared.      Intake/Output Summary (Last 24 hours) at 7/6/2023 1314  Last data filed at 7/5/2023 2043  Gross per 24 hour   Intake 200 ml   Output 700 ml   Net -500 ml           Physical Exam   Constitutional: She is oriented to person, place, and time.   CHAN:   Head:  Normocephalic.   Nose: Nose normal.   Cardiovascular: Normal rate.   Pulmonary/Chest: Effort normal.   Musculoskeletal:         General: No swelling.      Right elbow: Normal.      Left elbow: Normal.      Right wrist: Normal.      Left wrist: Normal.      Right knee: Normal.      Left knee: Normal.   Neurological: She is alert and oriented to person, place, and time. She displays weakness.   Lower extremity weakness R>L    Skin: No rash noted.   Psychiatric: Mood normal.       Right Side Rheumatological Exam     Examination finds the elbow, wrist, knee, 1st PIP, 1st MCP, 2nd PIP, 2nd MCP, 3rd PIP, 3rd MCP, 4th PIP, 4th MCP, 5th PIP and 5th MCP normal.    Left Side Rheumatological Exam     Examination finds the elbow, wrist, knee, 1st PIP, 1st MCP, 2nd PIP, 2nd MCP, 3rd PIP, 3rd MCP, 4th PIP, 4th MCP, 5th PIP and 5th MCP normal.         Significant Labs:  All pertinent lab results from the last 24 hours have been reviewed.    Significant Imaging:  Imaging results within the past 24 hours have been reviewed.

## 2023-07-07 LAB — BACTERIA STL CULT: NORMAL

## 2023-07-14 LAB — AQP4 H2O CHANNEL IGG SERPL QL: NEGATIVE

## 2023-08-26 ENCOUNTER — HOSPITAL ENCOUNTER (EMERGENCY)
Facility: HOSPITAL | Age: 33
Discharge: HOME | End: 2023-08-26
Attending: EMERGENCY MEDICINE
Payer: MEDICARE

## 2023-08-26 VITALS
HEIGHT: 59 IN | RESPIRATION RATE: 25 BRPM | HEART RATE: 101 BPM | BODY MASS INDEX: 23.79 KG/M2 | SYSTOLIC BLOOD PRESSURE: 113 MMHG | DIASTOLIC BLOOD PRESSURE: 77 MMHG | WEIGHT: 118 LBS | OXYGEN SATURATION: 100 % | TEMPERATURE: 98 F

## 2023-08-26 DIAGNOSIS — R20.2 PARESTHESIA: Primary | ICD-10-CM

## 2023-08-26 PROCEDURE — 99282 EMERGENCY DEPT VISIT SF MDM: CPT

## 2023-08-27 ENCOUNTER — HOSPITAL ENCOUNTER (EMERGENCY)
Facility: HOSPITAL | Age: 33
Discharge: HOME | End: 2023-08-27
Attending: EMERGENCY MEDICINE
Payer: MEDICARE

## 2023-08-27 VITALS
SYSTOLIC BLOOD PRESSURE: 123 MMHG | HEIGHT: 59 IN | OXYGEN SATURATION: 100 % | WEIGHT: 118 LBS | BODY MASS INDEX: 23.79 KG/M2 | DIASTOLIC BLOOD PRESSURE: 79 MMHG | HEART RATE: 106 BPM | RESPIRATION RATE: 17 BRPM | TEMPERATURE: 98 F

## 2023-08-27 DIAGNOSIS — R68.89 MULTIPLE COMPLAINTS: Primary | ICD-10-CM

## 2023-08-27 DIAGNOSIS — R00.0 TACHYCARDIA: ICD-10-CM

## 2023-08-27 PROBLEM — M51.369 DEGENERATION OF INTERVERTEBRAL DISC OF LUMBAR REGION: Status: ACTIVE | Noted: 2023-08-27

## 2023-08-27 PROBLEM — G61.0 AIDP (ACUTE INFLAMMATORY DEMYELINATING POLYNEUROPATHY): Status: ACTIVE | Noted: 2023-07-06

## 2023-08-27 PROBLEM — N80.9 ENDOMETRIOSIS: Status: ACTIVE | Noted: 2023-08-27

## 2023-08-27 PROBLEM — M79.7 FIBROMYALGIA: Status: ACTIVE | Noted: 2023-07-06

## 2023-08-27 PROBLEM — Z87.59 H/O ONE MISCARRIAGE: Status: ACTIVE | Noted: 2020-09-17

## 2023-08-27 PROBLEM — M54.50 LOW BACK PAIN: Status: ACTIVE | Noted: 2023-08-27

## 2023-08-27 PROBLEM — E55.9 VITAMIN D DEFICIENCY: Status: ACTIVE | Noted: 2020-09-17

## 2023-08-27 PROBLEM — M51.36 DEGENERATION OF INTERVERTEBRAL DISC OF LUMBAR REGION: Status: ACTIVE | Noted: 2023-08-27

## 2023-08-27 PROBLEM — E03.8 HYPOTHYROIDISM DUE TO HASHIMOTO'S THYROIDITIS: Status: ACTIVE | Noted: 2023-07-06

## 2023-08-27 PROBLEM — E06.3 HYPOTHYROIDISM DUE TO HASHIMOTO'S THYROIDITIS: Status: ACTIVE | Noted: 2023-07-06

## 2023-08-27 PROBLEM — M32.9 SYSTEMIC LUPUS ERYTHEMATOSUS: Status: ACTIVE | Noted: 2023-08-27

## 2023-08-27 PROBLEM — M06.4 INFLAMMATORY POLYARTHROPATHY: Status: ACTIVE | Noted: 2020-09-17

## 2023-08-27 PROBLEM — M32.9 LUPUS: Status: ACTIVE | Noted: 2020-09-17

## 2023-08-27 PROBLEM — R79.89 LOW VITAMIN D LEVEL: Status: ACTIVE | Noted: 2023-08-27

## 2023-08-27 PROBLEM — M06.9 RHEUMATOID ARTHRITIS: Status: ACTIVE | Noted: 2023-08-27

## 2023-08-27 PROBLEM — K51.90 ULCERATIVE COLITIS WITHOUT COMPLICATIONS: Status: ACTIVE | Noted: 2023-07-06

## 2023-08-27 PROBLEM — M41.9 SCOLIOSIS: Status: ACTIVE | Noted: 2023-08-27

## 2023-08-27 LAB
ALBUMIN SERPL BCP-MCNC: 4.5 G/DL (ref 3.5–5.2)
ALP SERPL-CCNC: 83 U/L (ref 55–135)
ALT SERPL W/O P-5'-P-CCNC: 8 U/L (ref 10–44)
AMPHET+METHAMPHET UR QL: ABNORMAL
ANION GAP SERPL CALC-SCNC: 13 MMOL/L (ref 8–16)
AST SERPL-CCNC: 18 U/L (ref 10–40)
B-HCG UR QL: NEGATIVE
BARBITURATES UR QL SCN>200 NG/ML: NEGATIVE
BASOPHILS # BLD AUTO: 0.07 K/UL (ref 0–0.2)
BASOPHILS NFR BLD: 0.6 % (ref 0–1.9)
BENZODIAZ UR QL SCN>200 NG/ML: NEGATIVE
BILIRUB SERPL-MCNC: 0.8 MG/DL (ref 0.1–1)
BILIRUB UR QL STRIP: NEGATIVE
BNP SERPL-MCNC: <10 PG/ML (ref 0–99)
BUN SERPL-MCNC: 15 MG/DL (ref 6–20)
BZE UR QL SCN: NEGATIVE
CALCIUM SERPL-MCNC: 9.7 MG/DL (ref 8.7–10.5)
CANNABINOIDS UR QL SCN: NEGATIVE
CHLORIDE SERPL-SCNC: 108 MMOL/L (ref 95–110)
CLARITY UR REFRACT.AUTO: ABNORMAL
CO2 SERPL-SCNC: 19 MMOL/L (ref 23–29)
COLOR UR AUTO: YELLOW
CREAT SERPL-MCNC: 1.2 MG/DL (ref 0.5–1.4)
CREAT UR-MCNC: 54 MG/DL (ref 15–325)
CTP QC/QA: YES
DIFFERENTIAL METHOD: ABNORMAL
EOSINOPHIL # BLD AUTO: 0 K/UL (ref 0–0.5)
EOSINOPHIL NFR BLD: 0.3 % (ref 0–8)
ERYTHROCYTE [DISTWIDTH] IN BLOOD BY AUTOMATED COUNT: 12.9 % (ref 11.5–14.5)
EST. GFR  (NO RACE VARIABLE): >60 ML/MIN/1.73 M^2
GLUCOSE SERPL-MCNC: 86 MG/DL (ref 70–110)
GLUCOSE UR QL STRIP: NEGATIVE
HCT VFR BLD AUTO: 39.1 % (ref 37–48.5)
HGB BLD-MCNC: 14 G/DL (ref 12–16)
HGB UR QL STRIP: NEGATIVE
HIV 1+2 AB+HIV1 P24 AG SERPL QL IA: NORMAL
IMM GRANULOCYTES # BLD AUTO: 0.03 K/UL (ref 0–0.04)
IMM GRANULOCYTES NFR BLD AUTO: 0.3 % (ref 0–0.5)
KETONES UR QL STRIP: ABNORMAL
LACTATE SERPL-SCNC: 1.2 MMOL/L (ref 0.5–2.2)
LEUKOCYTE ESTERASE UR QL STRIP: NEGATIVE
LIPASE SERPL-CCNC: 48 U/L (ref 4–60)
LYMPHOCYTES # BLD AUTO: 3.4 K/UL (ref 1–4.8)
LYMPHOCYTES NFR BLD: 29 % (ref 18–48)
MCH RBC QN AUTO: 32.8 PG (ref 27–31)
MCHC RBC AUTO-ENTMCNC: 35.8 G/DL (ref 32–36)
MCV RBC AUTO: 92 FL (ref 82–98)
METHADONE UR QL SCN>300 NG/ML: NEGATIVE
MONOCYTES # BLD AUTO: 1.1 K/UL (ref 0.3–1)
MONOCYTES NFR BLD: 9 % (ref 4–15)
NEUTROPHILS # BLD AUTO: 7.2 K/UL (ref 1.8–7.7)
NEUTROPHILS NFR BLD: 60.8 % (ref 38–73)
NITRITE UR QL STRIP: NEGATIVE
NRBC BLD-RTO: 0 /100 WBC
OPIATES UR QL SCN: NEGATIVE
PCP UR QL SCN>25 NG/ML: NEGATIVE
PH UR STRIP: 6 [PH] (ref 5–8)
PLATELET # BLD AUTO: 637 K/UL (ref 150–450)
PMV BLD AUTO: 10.3 FL (ref 9.2–12.9)
POTASSIUM SERPL-SCNC: 3.4 MMOL/L (ref 3.5–5.1)
PROT SERPL-MCNC: 7.8 G/DL (ref 6–8.4)
PROT UR QL STRIP: NEGATIVE
RBC # BLD AUTO: 4.27 M/UL (ref 4–5.4)
SODIUM SERPL-SCNC: 140 MMOL/L (ref 136–145)
SP GR UR STRIP: 1.01 (ref 1–1.03)
TOXICOLOGY INFORMATION: ABNORMAL
TROPONIN I SERPL DL<=0.01 NG/ML-MCNC: <0.006 NG/ML (ref 0–0.03)
TSH SERPL DL<=0.005 MIU/L-ACNC: 1.2 UIU/ML (ref 0.4–4)
URN SPEC COLLECT METH UR: ABNORMAL
WBC # BLD AUTO: 11.84 K/UL (ref 3.9–12.7)

## 2023-08-27 PROCEDURE — 25000003 PHARM REV CODE 250: Performed by: PHYSICIAN ASSISTANT

## 2023-08-27 PROCEDURE — 93005 ELECTROCARDIOGRAM TRACING: CPT

## 2023-08-27 PROCEDURE — 93010 EKG 12-LEAD: ICD-10-PCS | Mod: ,,, | Performed by: INTERNAL MEDICINE

## 2023-08-27 PROCEDURE — 81003 URINALYSIS AUTO W/O SCOPE: CPT | Mod: 59 | Performed by: PHYSICIAN ASSISTANT

## 2023-08-27 PROCEDURE — 93010 ELECTROCARDIOGRAM REPORT: CPT | Mod: ,,, | Performed by: INTERNAL MEDICINE

## 2023-08-27 PROCEDURE — 83880 ASSAY OF NATRIURETIC PEPTIDE: CPT | Performed by: PHYSICIAN ASSISTANT

## 2023-08-27 PROCEDURE — 84484 ASSAY OF TROPONIN QUANT: CPT | Performed by: PHYSICIAN ASSISTANT

## 2023-08-27 PROCEDURE — 83690 ASSAY OF LIPASE: CPT | Performed by: PHYSICIAN ASSISTANT

## 2023-08-27 PROCEDURE — 85025 COMPLETE CBC W/AUTO DIFF WBC: CPT | Performed by: PHYSICIAN ASSISTANT

## 2023-08-27 PROCEDURE — 96374 THER/PROPH/DIAG INJ IV PUSH: CPT | Mod: 59

## 2023-08-27 PROCEDURE — 63600175 PHARM REV CODE 636 W HCPCS: Performed by: PHYSICIAN ASSISTANT

## 2023-08-27 PROCEDURE — 99285 EMERGENCY DEPT VISIT HI MDM: CPT | Mod: 25

## 2023-08-27 PROCEDURE — 81025 URINE PREGNANCY TEST: CPT | Performed by: PHYSICIAN ASSISTANT

## 2023-08-27 PROCEDURE — 80053 COMPREHEN METABOLIC PANEL: CPT | Performed by: PHYSICIAN ASSISTANT

## 2023-08-27 PROCEDURE — 80307 DRUG TEST PRSMV CHEM ANLYZR: CPT | Performed by: PHYSICIAN ASSISTANT

## 2023-08-27 PROCEDURE — 83605 ASSAY OF LACTIC ACID: CPT | Performed by: PHYSICIAN ASSISTANT

## 2023-08-27 PROCEDURE — 25500020 PHARM REV CODE 255: Performed by: EMERGENCY MEDICINE

## 2023-08-27 PROCEDURE — 84443 ASSAY THYROID STIM HORMONE: CPT | Performed by: PHYSICIAN ASSISTANT

## 2023-08-27 PROCEDURE — 96361 HYDRATE IV INFUSION ADD-ON: CPT

## 2023-08-27 PROCEDURE — 87389 HIV-1 AG W/HIV-1&-2 AB AG IA: CPT | Performed by: PHYSICIAN ASSISTANT

## 2023-08-27 RX ORDER — ASPIRIN 325 MG/1
200 TABLET, FILM COATED ORAL
COMMUNITY
Start: 2023-08-23 | End: 2023-09-22

## 2023-08-27 RX ORDER — LORAZEPAM 2 MG/ML
1 INJECTION INTRAMUSCULAR
Status: COMPLETED | OUTPATIENT
Start: 2023-08-27 | End: 2023-08-27

## 2023-08-27 RX ORDER — AZITHROMYCIN 250 MG/1
250 TABLET, FILM COATED ORAL DAILY
Qty: 6 TABLET | Refills: 0 | Status: SHIPPED | OUTPATIENT
Start: 2023-08-27

## 2023-08-27 RX ORDER — TRAZODONE HYDROCHLORIDE 50 MG/1
50 TABLET ORAL NIGHTLY
COMMUNITY
Start: 2023-08-14

## 2023-08-27 RX ORDER — OXYCODONE AND ACETAMINOPHEN 7.5; 325 MG/1; MG/1
1 TABLET ORAL EVERY 8 HOURS PRN
COMMUNITY
Start: 2023-08-23

## 2023-08-27 RX ADMIN — POTASSIUM BICARBONATE 40 MEQ: 391 TABLET, EFFERVESCENT ORAL at 10:08

## 2023-08-27 RX ADMIN — LORAZEPAM 1 MG: 2 INJECTION INTRAMUSCULAR at 06:08

## 2023-08-27 RX ADMIN — IOHEXOL 100 ML: 350 INJECTION, SOLUTION INTRAVENOUS at 08:08

## 2023-08-27 RX ADMIN — SODIUM CHLORIDE 1000 ML: 0.9 INJECTION, SOLUTION INTRAVENOUS at 06:08

## 2023-08-27 NOTE — ED PROVIDER NOTES
Encounter Date: 8/26/2023       History     Chief Complaint   Patient presents with    Multiple Complaints      Pt c/o increased back pain, rapid heart rate, and intermittent tingling blue extremities. Pt recently discharged from [facility] for GBS       This is a 33-year-old woman with a strange past medical history including apparent central to distal paresthesia followed by paresis.  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  [facility] suggestive atypical organic neurologic condition versus somatic manifestations of anxiety.  Objective data appear to have been inconsistent, but do appear to have included toxicology positive for substances of abuse.  The patient feels confident her symptoms are related to having received childhood immunizations.  The patient and her  do have a number of apparent grandiose likely non bizarre delusional structures including the fact that the couple runs the largest [employer] in the [employer], and that her primary care doctor is the previous Alan of the [employer].  I do not recognize the name of the person they mention but it is not Ed Espinosa or John Alexander.  They report previous neurology team had recommended IV IG for treatment of the Guillain-[place] syndrome.  They endorse the IVIG was at least as bad as the childhood immunizations.  She presents today overtly anxious and somewhat tachycardic.  While the recent records do not appear to offer a unifying conclusion about the care, they also endorse she has been treated intermittently at the [facility] in [facility], also without improvement in symptoms.      Neurologic Problem  The primary symptoms include dizziness and paresthesias. Primary symptoms do not include headaches, syncope, loss of consciousness, altered mental status, seizures, visual change, focal weakness, loss of sensation, speech change, memory loss, fever, nausea or  vomiting. The symptoms began several weeks ago. The symptoms are waxing and waning. The neurological symptoms are multifocal. Context: As above.   She describes the dizziness as vertigo. The dizziness began more than 1 week ago. Progression since onset: Waxing and waning course. Associated with: No apparent provocateur. Dizziness also occurs with weakness. Dizziness does not occur with nausea or vomiting.   Paresthesias began greater than 24 hours ago. The paresthesias are waxing and waning. The paresthesias are described as burning, pricking and tingling. Affected locations include the: head, neck, left side of the face, right side of the face, left shoulder, right shoulder, left upper arm, right upper arm, left forearm, right forearm, left hand, right hand, chest, back, abdomen, left thigh and right thigh.   Additional symptoms include weakness. Additional symptoms do not include neck stiffness, pain, lower back pain, leg pain, loss of balance, photophobia, aura, hallucinations, nystagmus, taste disturbance or hyperacusis. Medical issues do not include seizures, cerebral vascular accident, cancer, alcohol use, drug use or diabetes.     Review of patient's allergies indicates:   Allergen Reactions    Cefaclor Hives and Swelling    Fluconazole Swelling    Levofloxacin Swelling    Sulfa (sulfonamide antibiotics) Swelling    Trimethoprim Swelling     Past Medical History:   Diagnosis Date    Systemic lupus erythematosus, organ or system involvement unspecified     Ulcerative colitis      No past surgical history on file.  No family history on file.     Review of Systems   Constitutional:  Negative for fever.   Eyes:  Negative for photophobia.   Cardiovascular:  Negative for syncope.   Gastrointestinal:  Negative for nausea and vomiting.   Musculoskeletal:  Negative for neck stiffness.   Neurological:  Positive for dizziness, weakness and paresthesias. Negative for speech change, focal weakness, seizures, loss of  consciousness, headaches and loss of balance.   Psychiatric/Behavioral:  Negative for hallucinations and memory loss.    All other systems reviewed and are negative.      Physical Exam     Initial Vitals [08/26/23 2231]   BP Pulse Resp Temp SpO2   (!) 140/89 (!) 121 20 97.5 °F (36.4 °C) 100 %      MAP       --         Physical Exam    Nursing note and vitals reviewed.  Constitutional: She appears well-developed and well-nourished. She is not diaphoretic. No distress.   HENT:   Head: Normocephalic and atraumatic.   Right Ear: External ear normal.   Left Ear: External ear normal.   Eyes: EOM are normal. Pupils are equal, round, and reactive to light. Right eye exhibits no discharge. Left eye exhibits no discharge.   Neck: Neck supple. No thyromegaly present. No tracheal deviation present. No JVD present.   Normal range of motion.  Cardiovascular:  Normal rate, regular rhythm, normal heart sounds and intact distal pulses.     Exam reveals no gallop and no friction rub.       No murmur heard.  Pulmonary/Chest: Breath sounds normal. No stridor. No respiratory distress. She has no wheezes. She has no rhonchi. She has no rales.   Abdominal: Abdomen is soft. Bowel sounds are normal. She exhibits no distension. There is no abdominal tenderness. There is no rebound and no guarding.   Musculoskeletal:         General: No tenderness or edema. Normal range of motion.      Cervical back: Normal range of motion and neck supple.     Neurological: She is alert and oriented to person, place, and time. She has normal strength. No cranial nerve deficit or sensory deficit. GCS score is 15. GCS eye subscore is 4. GCS verbal subscore is 5. GCS motor subscore is 6.   Skin: Skin is warm and dry. Capillary refill takes less than 2 seconds. No rash and no abscess noted. No erythema. No pallor.   Psychiatric: She has a normal mood and affect. Her behavior is normal. Judgment and thought content normal.         ED Course   Procedures  Labs  Reviewed - No data to display       Imaging Results    None          Medications - No data to display  Medical Decision Making  33-year-old female with multiple vague complaints, uncertain whether these represent somatic manifestations of a psychological condition or organic manifestations of a not yet sufficiently characterized disease process.  Differential diagnosis necessarily broad, no working diagnosis established on objective grounds despite a number of investigations spanning 15 years' duration.    Amount and/or Complexity of Data Reviewed  External Data Reviewed: notes.     Details: Previous records reviewed, no features found suggestive of acute decompensation today.  Rather found compatible with ongoing intermittent waxing and waning course has previously described.    Risk  Risk Details: Unable to diminish risk on clinical grounds alone.  Options discussed with patient.  Patient's mental status more than sufficient to cooperate with information and consent process.  Patient not willing to pursue additional evaluation at this time.  Patient is discharged with anticipatory guidance, return precautions, follow-up instructions.  Home in stable condition without event.  She reports she will follow up as planned with neurology on Monday.                               Clinical Impression:   Final diagnoses:  [R20.2] Paresthesia (Primary)        ED Disposition Condition    Discharge Stable          ED Prescriptions    None       Follow-up Information       Follow up With Specialties Details Why Contact Info    [facility] Emergency Medicine  As needed, If symptoms worsen [address] [facility] [address] [facility] [address] [phone]    Charlie Smith MD Family Medicine Call   [address] [address] [phone]               Wally Mccartney MD  08/26/23 8464

## 2023-08-27 NOTE — ED TRIAGE NOTES
Pt. Is a 33 yr old  female presenting with red rash, and is afraid that her nails are turning blue.  Hx:GB

## 2023-08-28 NOTE — DISCHARGE INSTRUCTIONS
Tests today showed:   Labs Reviewed   CBC W/ AUTO DIFFERENTIAL - Abnormal; Notable for the following components:       Result Value    MCH 32.8 (*)     Platelets 637 (*)     Mono # 1.1 (*)     All other components within normal limits   COMPREHENSIVE METABOLIC PANEL - Abnormal; Notable for the following components:    Potassium 3.4 (*)     CO2 19 (*)     ALT 8 (*)     All other components within normal limits   URINALYSIS, REFLEX TO URINE CULTURE - Abnormal; Notable for the following components:    Appearance, UA Hazy (*)     Ketones, UA 1+ (*)     All other components within normal limits    Narrative:     Specimen Source->Urine   DRUG SCREEN PANEL, URINE EMERGENCY - Abnormal; Notable for the following components:    Amphetamine Screen, Ur Presumptive Positive (*)     All other components within normal limits    Narrative:     ADD ON [facility] #[id number] PER ELIECER MALLORY MD  08/27/2023  20:54       HIV 1 / 2 ANTIBODY    Narrative:     Release to patient->Immediate   B-TYPE NATRIURETIC PEPTIDE   TROPONIN I   TSH   LIPASE   LACTIC ACID, PLASMA   DRUG SCREEN PANEL, URINE EMERGENCY   DRUG SCREEN PANEL, URINE EMERGENCY   POCT URINE PREGNANCY     CTA Chest Non-Coronary (PE Studies)   Final Result      1. No evidence of pulmonary embolism.   2. Minimal ground-glass changes bilaterally could represent minimal pneumonitis or edema.   3. Nondistention of the transverse colon and descending colon.  Mild wall thickening/colitis is a consideration.  Recommend clinical correlation.         Electronically signed by: Michoacano Fernandez   Date:    08/27/2023   Time:    21:34      CT Abdomen Pelvis With Contrast   Final Result      1. No evidence of pulmonary embolism.   2. Minimal ground-glass changes bilaterally could represent minimal pneumonitis or edema.   3. Nondistention of the transverse colon and descending colon.  Mild wall thickening/colitis is a consideration.  Recommend clinical correlation.         Electronically signed  by: Michoacano Fernandez   Date:    08/27/2023   Time:    21:34      CT Head Without Contrast   Final Result      No acute intracranial abnormalities.         Electronically signed by: Justin Kinsey MD   Date:    08/27/2023   Time:    21:00          Treatments you had today:   Medications   potassium bicarbonate disintegrating tablet 40 mEq (has no administration in time range)   sodium chloride 0.9% bolus 1,000 mL 1,000 mL (1,000 mLs Intravenous New Bag 8/27/23 1845)   LORazepam injection 1 mg (1 mg Intravenous Given 8/27/23 1845)   iohexoL (OMNIPAQUE 350) injection 100 mL (100 mLs Intravenous Given 8/27/23 2050)       Follow-Up Plan:  - Follow-up with primary care doctor within 3 - 5 days  - Additional testing and/or evaluation as directed by your primary doctor    Return to the Emergency Department for symptoms including but not limited to: worsening symptoms, shortness of breath or chest pain, vomiting with inability to hold down fluids, fevers greater than 100.4°F, passing out/fainting/unconsciousness, or other concerning symptoms.

## 2023-08-28 NOTE — ED PROVIDER NOTES
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  She reports tingling sensation in her face as well as chest pain, palpitations, rapid heartbeat, shortness of breath, tinnitus, cough and dry mouth.  She is also noticed intermittent color changing of her hands and legs with bluish discoloration of her fingertips and some mottling of the skin on her legs.  She has some leg weakness that has been persistent since she was hospitalized for Guillain-[place] but notes that this seems stable.  She has had multiple evaluations for these symptoms without clear etiology.      Review of patient's allergies indicates:   Allergen Reactions    Cefaclor Hives and Swelling    Diphenoxylate-atropine Swelling    Fluconazole Swelling    Levofloxacin Swelling    Sulfa (sulfonamide antibiotics) Swelling    Trimethoprim Swelling     Past Medical History:   Diagnosis Date    Systemic lupus erythematosus, organ or system involvement unspecified     Ulcerative colitis      History reviewed. No pertinent surgical history.  History reviewed. No pertinent family history.     Review of Systems    Physical Exam     Initial Vitals [08/27/23 1710]   BP Pulse Resp Temp SpO2   128/81 (!) 130 18 98.7 °F (37.1 °C) 100 %      MAP       --         Physical Exam    Nursing note and vitals reviewed.  Constitutional: She appears well-developed and well-nourished. She is not diaphoretic. No distress.   HENT:   Head: Normocephalic and atraumatic.   Eyes: EOM are normal. Pupils are equal, round, and reactive to light.   Neck:   Normal range of motion.  Cardiovascular:  Regular rhythm, normal heart sounds and  intact distal pulses.     Exam reveals no gallop and no friction rub.       No murmur heard.  Tachycardic    Strong 2+ bilateral radial and pedal pulses.    No lower extremity edema, erythema, tenderness or warmth   Pulmonary/Chest: Breath sounds normal. No respiratory distress. She has no wheezes. She has no rhonchi. She has no rales. She exhibits no tenderness.   Abdominal: Abdomen is soft. Bowel sounds are normal. She exhibits no distension and no mass. There is abdominal tenderness. There is no rebound and no guarding.   Musculoskeletal:         General: Normal range of motion.      Cervical back: Normal range of motion.     Neurological: She is alert and oriented to person, place, and time. She has normal strength. No cranial nerve deficit or sensory deficit. GCS score is 15. GCS eye subscore is 4. GCS verbal subscore is 5. GCS motor subscore is 6.   Able to ambulate with steady gait   Skin: Skin is warm and dry. Capillary refill takes less than 2 seconds.   Brisk capillary refill   Psychiatric: Her mood appears anxious. Her speech is rapid and/or pressured.         ED Course   Procedures  Labs Reviewed   CBC W/ AUTO DIFFERENTIAL - Abnormal; Notable for the following components:       Result Value    MCH 32.8 (*)     Platelets 637 (*)     Mono # 1.1 (*)     All other components within normal limits   COMPREHENSIVE METABOLIC PANEL - Abnormal; Notable for the following components:    Potassium 3.4 (*)     CO2 19 (*)     ALT 8 (*)     All other components within normal limits   URINALYSIS, REFLEX TO URINE CULTURE - Abnormal; Notable for the following components:    Appearance, UA Hazy (*)     Ketones, UA 1+ (*)     All other components within normal limits    Narrative:     Specimen Source->Urine   DRUG SCREEN PANEL, URINE EMERGENCY - Abnormal; Notable for the following components:    Amphetamine Screen, Ur Presumptive Positive (*)     All other components within normal limits    Narrative:     ADD ON [facility]  #[id number] PER ELIECER MALLORY MD  08/27/2023  20:54       HIV 1 / 2 ANTIBODY    Narrative:     Release to patient->Immediate   B-TYPE NATRIURETIC PEPTIDE   TROPONIN I   TSH   LIPASE   LACTIC ACID, PLASMA   DRUG SCREEN PANEL, URINE EMERGENCY   DRUG SCREEN PANEL, URINE EMERGENCY   POCT URINE PREGNANCY     EKG Readings: (Independently Interpreted)   Initial Reading: No STEMI. Previous EKG: Compared with most recent EKG Previous EKG Date: 8/26/2023. Rhythm: Sinus Tachycardia. Heart Rate: 137. Ectopy: No Ectopy. ST Segments: Normal ST Segments. Clinical Impression: Sinus Tachycardia       Imaging Results              CTA Chest Non-Coronary (PE Studies) (Final result)  Result time 08/27/23 21:34:56      Final result by Michoacano Avila MD (08/27/23 21:34:56)                   Impression:      1. No evidence of pulmonary embolism.  2. Minimal ground-glass changes bilaterally could represent minimal pneumonitis or edema.  3. Nondistention of the transverse colon and descending colon.  Mild wall thickening/colitis is a consideration.  Recommend clinical correlation.      Electronically signed by: Michoacano Avila  Date:    08/27/2023  Time:    21:34               Narrative:    EXAMINATION:  CT ABDOMEN PELVIS WITH CONTRAST; CTA CHEST NON CORONARY (PE STUDIES)    CLINICAL HISTORY:  Abdominal pain, acute, nonlocalized;; Pulmonary embolism (PE) suspected, high prob;    TECHNIQUE:  Low dose axial images, sagittal and coronal reformations were obtained from the lung bases to the pubic symphysis following the IV administration of 100 mL of Omnipaque 350 .  Oral contrast was not administered.    A CTA chest study is submitted under the heading CT abdomen and pelvis.    COMPARISON:  None.    FINDINGS:  Chest:    No evidence of pulmonary embolism.    The heart is normal in size.    No effusion or pneumothorax.  No mass or consolidation.  Minimal ground-glass changes bilaterally could represent minimal pneumonitis or edema.  No  focal infiltrate.    Aorta is normal in caliber with no evidence aneurysm or dissection.    Abdomen:    - Lower thorax:    - Lung bases: No infiltrates and no nodules.    - Liver: No focal mass.    - Gallbladder: The gallbladder is hyperdense which may be associated with vicarious excretion of contrast or possible sludge.  No wall thickening.  No evidence of acute cholecystitis.    - Bile Ducts: No evidence of intra or extra hepatic biliary ductal dilation.    - Spleen: Negative.    - Kidneys: No stone, mass or hydronephrosis.    - Adrenals: Unremarkable.    - Pancreas: No mass or peripancreatic fat stranding.    - Retroperitoneum:  No significant adenopathy.    - Vascular: No abdominal aortic aneurysm.    - Abdominal wall:  Unremarkable.    Pelvis:    Urinary bladder is within normal limits.  Few follicles in the ovaries.  The uterus is mildly retroverted at the midline.    Bowel/Mesentery:    No evidence of bowel obstruction.  There is nondistention of the transverse colon and descending colon.  Mild wall thickening/colitis is a possibility.    Mild retained feces in the proximal colon and rectum.    Bones:  No acute osseous abnormality and no suspicious lytic or blastic lesion.    The appendix is within normal limits.                                       CT Abdomen Pelvis With Contrast (Final result)  Result time 08/27/23 21:34:56      Final result by Michoacano Avila MD (08/27/23 21:34:56)                   Impression:      1. No evidence of pulmonary embolism.  2. Minimal ground-glass changes bilaterally could represent minimal pneumonitis or edema.  3. Nondistention of the transverse colon and descending colon.  Mild wall thickening/colitis is a consideration.  Recommend clinical correlation.      Electronically signed by: Michoacano Avila  Date:    08/27/2023  Time:    21:34               Narrative:    EXAMINATION:  CT ABDOMEN PELVIS WITH CONTRAST; CTA CHEST NON CORONARY (PE STUDIES)    CLINICAL  HISTORY:  Abdominal pain, acute, nonlocalized;; Pulmonary embolism (PE) suspected, high prob;    TECHNIQUE:  Low dose axial images, sagittal and coronal reformations were obtained from the lung bases to the pubic symphysis following the IV administration of 100 mL of Omnipaque 350 .  Oral contrast was not administered.    A CTA chest study is submitted under the heading CT abdomen and pelvis.    COMPARISON:  None.    FINDINGS:  Chest:    No evidence of pulmonary embolism.    The heart is normal in size.    No effusion or pneumothorax.  No mass or consolidation.  Minimal ground-glass changes bilaterally could represent minimal pneumonitis or edema.  No focal infiltrate.    Aorta is normal in caliber with no evidence aneurysm or dissection.    Abdomen:    - Lower thorax:    - Lung bases: No infiltrates and no nodules.    - Liver: No focal mass.    - Gallbladder: The gallbladder is hyperdense which may be associated with vicarious excretion of contrast or possible sludge.  No wall thickening.  No evidence of acute cholecystitis.    - Bile Ducts: No evidence of intra or extra hepatic biliary ductal dilation.    - Spleen: Negative.    - Kidneys: No stone, mass or hydronephrosis.    - Adrenals: Unremarkable.    - Pancreas: No mass or peripancreatic fat stranding.    - Retroperitoneum:  No significant adenopathy.    - Vascular: No abdominal aortic aneurysm.    - Abdominal wall:  Unremarkable.    Pelvis:    Urinary bladder is within normal limits.  Few follicles in the ovaries.  The uterus is mildly retroverted at the midline.    Bowel/Mesentery:    No evidence of bowel obstruction.  There is nondistention of the transverse colon and descending colon.  Mild wall thickening/colitis is a possibility.    Mild retained feces in the proximal colon and rectum.    Bones:  No acute osseous abnormality and no suspicious lytic or blastic lesion.    The appendix is within normal limits.                                       CT Head  Without Contrast (Final result)  Result time 08/27/23 21:00:16      Final result by Justin Kinsey MD (08/27/23 21:00:16)                   Impression:      No acute intracranial abnormalities.      Electronically signed by: Justin Kinsey MD  Date:    08/27/2023  Time:    21:00               Narrative:    EXAMINATION:  CT HEAD WITHOUT CONTRAST    CLINICAL HISTORY:  Mental status change, unknown cause;    TECHNIQUE:  Low dose axial images were obtained through the head.  Coronal and sagittal reformations were also performed. Contrast was not administered.    COMPARISON:  MRI brain and CT head 07/03/2023.    FINDINGS:  The brain parenchyma appears normal for age with good corticomedullary differentiation.  There is no evidence of acute infarct, hemorrhage, or mass.  The ventricular system is normal in size.  No mass-effect or midline shift.  There are no abnormal extra-axial fluid collections.  Right maxillary sinus large retention cyst, similar to prior head CT.  Remaining visualized paranasal sinuses and mastoid air cells are clear.  The calvarium appears intact.  .                                       Medications   sodium chloride 0.9% bolus 1,000 mL 1,000 mL (0 mLs Intravenous Stopped 8/27/23 2225)   LORazepam injection 1 mg (1 mg Intravenous Given 8/27/23 1845)   potassium bicarbonate disintegrating tablet 40 mEq (40 mEq Oral Given 8/27/23 2230)   iohexoL (OMNIPAQUE 350) injection 100 mL (100 mLs Intravenous Given 8/27/23 2050)     Medical Decision Making  33-year-old female presenting for multiple complaints including paresthesias, rapid heart rate, chest pain, shortness of breath and concerns about her circulation.  Per chart review, multiple recent admissions for leg weakness which was thought to be due to AIDP vs. GBS vs somatization disorder versus drug use.  She is tachycardic with heart rate of 130 with otherwise normal vitals.    Differential diagnosis:  Differential is wide, concerns include  "pulmonary embolism, ACS, dysrhythmia, dehydration, electrolyte derangement, thyroid dysfunction.  Both her hands and feet are warm and well perfused, I think the intermittent blueness in her fingers may be due to Raynaud's.  She has no clinical signs of vascular insufficiency, I do not suspect aortic dissection or acute limb ischemia.  Additional concerns include somatization disorder, drug use, anxiety.    Workup is so far reassuring.  Dispo pending results of imaging.  Discharge if workup is negative.  I discussed this patient with my supervising physician and I am signing out to Daphne Chavarria MD.    9:26 PM  Valorie Young PA-C      Amount and/or Complexity of Data Reviewed  External Data Reviewed: notes.     Details: Patient discharged 07/07/2023 after 3 day admission at [facility]. From discharge summary:    "Primary Care Team: Networked reference to record PCT      Pt with h/o of lupus followed locally by Dr. Cazares , ulcerative colitis, Hypothyroidism and fibromyalgia presented to the ED with c/o 2 days h/o ( started from Friday night) paresthesia and motor weakness involving both upper and lower ext ( proximal to distal) and  difficulty ambulating due to muscle weakness. Symptoms started following episodes of gastroenteritis and diarrhea on Thursday that resolved on Friday. Other associated symptoms include low back pain, sensation of pressure in the lower back but no saddle anesthesia , urinary or bowel disturbance. No h/o trauma or fall. Recent mild lupus flare up with onset of rash improved with prednisone therapy. Symptoms are progressive and worsening since onset. MRI brain, C/T/L spine w/wo contrast in the ED today were neg for demyelinating disease. Labs were all unremarkable. UA neg for infection. UDS + amphetamine, Fentanyl, Opiates and bezo, however pt denied use of such except Norco at home. Pt did receive Ativan, morphine, Zofran, Droperidol in the ED could " "contribute to abnormal UDS.      Symptoms improved somewhat and pt was able to getup and walk to the bathroom after MRI and shortly after receiving Droperidol.  Neurology suggested psych assessment.  Patient refused psychiatric evaluation.  Neurology was reconsulted as per patient request to be evaluated for Guillain-[place] syndrome.  Id and rheumatology was consulted as well...  Neurology suggested outpatient follow-up.  Initially the plan was to get will be.  Patient refused IV IG and LP and Wanted to be discharged.  Neurology cleared for discharge.  She will be discharged to home today.  All medications were reconciled.     Paresthesia and weakness of extremities of unclear etiology   Recent h/o Viral Gastroenteritis   H/O Lupus, UC, Hypothyroidism and Fibromyalgia"    She was seen at [facility] for a similar complaint 08/20/2023 and underwent admission.  Her symptoms improved with IV thiamine and she was discharge.  She was seen again in the ED last night for the same complaint      Labs: ordered. Decision-making details documented in ED Course.  Radiology: ordered. Decision-making details documented in ED Course.  ECG/medicine tests: ordered and independent interpretation performed.    Risk  Prescription drug management.              Attending Attestation:     Physician Attestation Statement for NP/PA:   I have directed and reviewed the workup performed by the PA/NP.  I performed the substantive portion of the medical decision making.     Other NP/PA Attestation Additions:    History of Present Illness: 33-year-old woman with comorbidities of lupus, thyroid disease, ulcerative colitis as well as recent concern for Guillain-[place] syndrome presents to the emergency department for evaluation of multiple complaints including intermittent mottling of her lower extremities, shortness of breath, tinnitus, as well as generalized weakness without recent trauma, fevers, vomiting.               ED Course as of " 08/28/23 0905   [place] Aug 27, 2023   1947 Preg Test, Ur: Negative [CC]   2016 Platelets(!): 637  Thrombocytosis [CC]   2016 Hemoglobin: 14.0  Not anemic [CC]   2016 Leukocytes, UA: Negative [CC]   2019 Patient able to ambulate with steady gait [CC]   2023 Lactate, Ranulfo: 1.2 [CC]   2037 Potassium(!): 3.4  Will replete [CC]   2110 CT Head Without Contrast  No ICH [CC]      ED Course User Index  [CC] Valorie Young PA-C                    Clinical Impression:   Final diagnoses:  [R00.0] Tachycardia  [R68.89] Multiple complaints (Primary)        ED Disposition Condition    Discharge Stable          ED Prescriptions       Medication Sig Dispense Start Date End Date Auth. Provider    azithromycin (Z-KHURRAM) 250 MG tablet Take 1 tablet (250 mg total) by mouth once daily. Take first 2 tablets together, then 1 every day until finished. 6 tablet 8/27/2023 -- Daphne Chavarria MD          Follow-up Information       Follow up With Specialties Details Why Contact Info    Charlie Smith MD Family Medicine Schedule an appointment as soon as possible for a visit   [address] [facility]  [place] [id number] [phone]      Your rheumatologist and neurologist  Schedule an appointment as soon as possible for a visit                Valorie Young PA-C  08/27/23 2126       Anselmo De Los Santos MD  08/28/23 0905

## 2023-08-28 NOTE — PROVIDER PROGRESS NOTES - EMERGENCY DEPT.
Signout Note    I received signout from the previous provider.     Chief complaint:  Multiple complaints (Has guillain barre , had ivig on the 6th, then having more symptoms, both legs and  arms mottled, was at [facility] in [place]  )      Pertinent history &exam:  Davonte Ortega is a 33 y.o. female with pertinent PMH of ulcerative colitis, Hashimoto's thyroiditis, rheumatoid arthritis who presented to emergency department with multiple complaints.  Afebrile, tachycardic, normotensive.  Recent hospitalization for Guillain-[place] versus functional disorder at outside hospital.  Labs here are reassuring.  On exam, she is warm and well perfused without any mottling.  No clinical evidence of vascular catastrophe.  She has follow-up with neurology and rheumatology tomorrow and is able to ambulate independently.  She was signed out pending CTs for final disposition    Vitals:    08/27/23 2037   BP:    Pulse:    Resp: 18   Temp:        Imaging Studies:    CTA Chest Non-Coronary (PE Studies)   Final Result      1. No evidence of pulmonary embolism.   2. Minimal ground-glass changes bilaterally could represent minimal pneumonitis or edema.   3. Nondistention of the transverse colon and descending colon.  Mild wall thickening/colitis is a consideration.  Recommend clinical correlation.         Electronically signed by: Michoacano Fernandez   Date:    08/27/2023   Time:    21:34      CT Abdomen Pelvis With Contrast   Final Result      1. No evidence of pulmonary embolism.   2. Minimal ground-glass changes bilaterally could represent minimal pneumonitis or edema.   3. Nondistention of the transverse colon and descending colon.  Mild wall thickening/colitis is a consideration.  Recommend clinical correlation.         Electronically signed by: Michoacano Fernandez   Date:    08/27/2023   Time:    21:34      CT Head Without Contrast   Final Result      No acute intracranial abnormalities.         Electronically signed by: Justin Kinsey  MD   Date:    08/27/2023   Time:    21:00          Medications Given:  Medications   potassium bicarbonate disintegrating tablet 40 mEq (has no administration in time range)   sodium chloride 0.9% bolus 1,000 mL 1,000 mL (1,000 mLs Intravenous New Bag 8/27/23 1845)   LORazepam injection 1 mg (1 mg Intravenous Given 8/27/23 1845)   iohexoL (OMNIPAQUE 350) injection 100 mL (100 mLs Intravenous Given 8/27/23 2050)       Pending Items/ MDM:  33 y.o. female with above complaint.      CT showing mild colitis, patient has known UC, states that she is been having 2 weeks of diarrhea but no abdominal pain or blood in stool.  Recommend following up with her gastroenterologist for this.      CT also shows possible pneumonitis.  She has a new cough that began today, not productive of sputum, and no fevers.  Rx'd Z-Octavio .  Plan discharge with follow-up tomorrow.    Diagnostic Impression:    1. Multiple complaints    2. Tachycardia         ED Disposition Condition    Discharge Stable          ED Prescriptions       Medication Sig Dispense Start Date End Date Auth. Provider    azithromycin (Z-OCTAVIO) 250 MG tablet Take 1 tablet (250 mg total) by mouth once daily. Take first 2 tablets together, then 1 every day until finished. 6 tablet 8/27/2023 -- Daphne Chavarria MD          Follow-up Information       Follow up With Specialties Details Why Contact Info    Charlie Smith MD Family Medicine Schedule an appointment as soon as possible for a visit   [address]  [place] [id number] [phone]      Your rheumatologist and neurologist  Schedule an appointment as soon as possible for a visit               Patient and family understands the plan and in agreement, verbalized understanding, questions answered    Daphne Chavarria MD  Emergency Medicine

## 2024-10-23 ENCOUNTER — PATIENT MESSAGE (OUTPATIENT)
Dept: GASTROENTEROLOGY | Facility: CLINIC | Age: 34
End: 2024-10-23
Payer: MEDICARE